# Patient Record
Sex: FEMALE | Race: WHITE | HISPANIC OR LATINO | Employment: UNEMPLOYED | RURAL
[De-identification: names, ages, dates, MRNs, and addresses within clinical notes are randomized per-mention and may not be internally consistent; named-entity substitution may affect disease eponyms.]

---

## 2022-12-27 LAB
ABO + RH BLD: NORMAL
ANTIBODY SCREEN: NEGATIVE
GLUCOSE SERPL-MCNC: 92 MG/DL
HBV SURFACE AG SERPL QL IA: NEGATIVE
HCT VFR BLD AUTO: 35 % (ref 36–46)
HGB BLD-MCNC: 11.9 G/DL (ref 12–16)
HIV 1+2 AB+HIV1 P24 AG SERPL QL IA: NORMAL
MCV RBC AUTO: 86 FL (ref 82–108)
PLATELET # BLD AUTO: 146 K/UL (ref 150–399)
RPR: NORMAL
RUBELLA IMMUNE STATUS: NORMAL
TSH SERPL DL<=0.005 MIU/L-ACNC: 0.77 UIU/ML (ref 0.41–5.9)

## 2023-04-17 LAB
GLUCOSE SERPL-MCNC: 100 MG/DL
HCT VFR BLD AUTO: 30.5 % (ref 36–46)
HGB BLD-MCNC: 10.2 G/DL (ref 12–16)
MCV RBC AUTO: 86 FL (ref 82–108)
PLATELET # BLD AUTO: 139 K/UL (ref 150–399)

## 2023-05-08 ENCOUNTER — OFFICE VISIT (OUTPATIENT)
Dept: OBSTETRICS AND GYNECOLOGY | Facility: CLINIC | Age: 33
End: 2023-05-08
Payer: COMMERCIAL

## 2023-05-08 VITALS
DIASTOLIC BLOOD PRESSURE: 67 MMHG | SYSTOLIC BLOOD PRESSURE: 96 MMHG | TEMPERATURE: 98 F | HEART RATE: 76 BPM | OXYGEN SATURATION: 98 % | BODY MASS INDEX: 43.66 KG/M2 | RESPIRATION RATE: 18 BRPM | HEIGHT: 63 IN | WEIGHT: 246.38 LBS

## 2023-05-08 DIAGNOSIS — Z86.2 H/O THROMBOCYTOPENIA: ICD-10-CM

## 2023-05-08 DIAGNOSIS — Z86.2 HISTORY OF ANEMIA: ICD-10-CM

## 2023-05-08 DIAGNOSIS — Z3A.31 31 WEEKS GESTATION OF PREGNANCY: Primary | ICD-10-CM

## 2023-05-08 LAB
BILIRUB SERPL-MCNC: NORMAL MG/DL
BLOOD, POC UA: NORMAL
GLUCOSE UR QL STRIP: NORMAL
KETONES UR QL STRIP: NORMAL
LEUKOCYTE ESTERASE URINE, POC: NORMAL
NITRITE, POC UA: NORMAL
PH, POC UA: 6.5
PROTEIN, POC: NORMAL
SPECIFIC GRAVITY, POC UA: 1.02
UROBILINOGEN, POC UA: 0.2

## 2023-05-08 PROCEDURE — 1159F MED LIST DOCD IN RCRD: CPT | Mod: ,,, | Performed by: ADVANCED PRACTICE MIDWIFE

## 2023-05-08 PROCEDURE — 3078F PR MOST RECENT DIASTOLIC BLOOD PRESSURE < 80 MM HG: ICD-10-PCS | Mod: ,,, | Performed by: ADVANCED PRACTICE MIDWIFE

## 2023-05-08 PROCEDURE — 3078F DIAST BP <80 MM HG: CPT | Mod: ,,, | Performed by: ADVANCED PRACTICE MIDWIFE

## 2023-05-08 PROCEDURE — 0501F PRENATAL FLOW SHEET: CPT | Mod: ,,, | Performed by: ADVANCED PRACTICE MIDWIFE

## 2023-05-08 PROCEDURE — 3074F PR MOST RECENT SYSTOLIC BLOOD PRESSURE < 130 MM HG: ICD-10-PCS | Mod: ,,, | Performed by: ADVANCED PRACTICE MIDWIFE

## 2023-05-08 PROCEDURE — 3008F BODY MASS INDEX DOCD: CPT | Mod: ,,, | Performed by: ADVANCED PRACTICE MIDWIFE

## 2023-05-08 PROCEDURE — 0501F PR INTITIAL PRENATAL CARE VISIT W/FLOW SHEET: ICD-10-PCS | Mod: ,,, | Performed by: ADVANCED PRACTICE MIDWIFE

## 2023-05-08 PROCEDURE — 3008F PR BODY MASS INDEX (BMI) DOCUMENTED: ICD-10-PCS | Mod: ,,, | Performed by: ADVANCED PRACTICE MIDWIFE

## 2023-05-08 PROCEDURE — 1159F PR MEDICATION LIST DOCUMENTED IN MEDICAL RECORD: ICD-10-PCS | Mod: ,,, | Performed by: ADVANCED PRACTICE MIDWIFE

## 2023-05-08 PROCEDURE — 3074F SYST BP LT 130 MM HG: CPT | Mod: ,,, | Performed by: ADVANCED PRACTICE MIDWIFE

## 2023-05-08 NOTE — PROGRESS NOTES
"32 y.o. female  at 31w4d   She c/o no problems. She is transferring care from clinic in [place]- a release of information has been signed and sent to obtain records. She indicated she has had a 1 hr glucola screen approximately 2 weeks ago and an ultrasound at the previous clinic 2 weeks ago. She denies any issues or problems during the current pregnancy or previous pregnancy.   Reports good fetal movement or fluttering. Denies any vaginal bleeding, leakage of fluid, cramping, contractions, or pressure.   Total weight gain/weight loss in pregnancy: 10.4 kg (23 lb 0.4 oz)     Records reviewed- had a total of 8 visits and 5 ultrasounds  EDC 2023 per 8 week and 5 day US performed on 2022 at [facility]     Vitals  BP: 96/67  Temp: 97.8 °F (36.6 °C)  Temp Source: Oral  Pulse: 76  Resp: 18  SpO2: 98 %  Height: 5' 3" (160 cm)  Weight: 111.8 kg (246 lb 6.4 oz)  Prenatal  Fundal Height (cm): 34 cm  Fetal Heart Rate: 139  Movement: Present  Urine Albumin/Glucose  Urine Albumin: Negative  Urine Glucose: Negative  Edema  LLE Edema: None  RLE Edema: None  Facial: None  Additional Edema?: No    Prenatal Labs: pending from previous clinic      A: 31w4d           ICD-10-CM ICD-9-CM    1. 31 weeks gestation of pregnancy  Z3A.31 V22.2 POCT URINALYSIS      2. History of anemia  Z86.2 V12.3       3. BMI 40.0-44.9, adult  Z68.41 V85.41           P: Bleeding, daily fetal kick counts, and  labor/labor precautions discussed.    The following were addressed during this visit:    29-32 Weeks  - Tdap Given   - Contraception/Tubal Consent   - Pre-registration   - Circumsision plans   - Op note review/ consent   - Birth Plan   - Pediatrician   - Fetal Kick Counts/PIH/PTL precautions   - Preeclampsia Education   - Quiet time   Release of information sent to obtain records  F/U in 1 week  Questions answered to desired level of satisfaction  Verbalized understanding to all information and instructions " provided.  Follow up in about 1 week (around 5/15/2023), or if symptoms worsen or fail to improve, for SADIA Augustin, BISI, CNM, WHNP-BC

## 2023-05-16 ENCOUNTER — ROUTINE PRENATAL (OUTPATIENT)
Dept: OBSTETRICS AND GYNECOLOGY | Facility: CLINIC | Age: 33
End: 2023-05-16
Payer: COMMERCIAL

## 2023-05-16 VITALS
BODY MASS INDEX: 43.68 KG/M2 | DIASTOLIC BLOOD PRESSURE: 66 MMHG | HEART RATE: 83 BPM | SYSTOLIC BLOOD PRESSURE: 98 MMHG | WEIGHT: 246.63 LBS

## 2023-05-16 DIAGNOSIS — Z36.89 ENCOUNTER FOR FETAL ANATOMIC SURVEY: ICD-10-CM

## 2023-05-16 DIAGNOSIS — Z3A.32 32 WEEKS GESTATION OF PREGNANCY: Primary | ICD-10-CM

## 2023-05-16 PROCEDURE — 0502F PR SUBSEQUENT PRENATAL CARE: ICD-10-PCS | Mod: ,,, | Performed by: ADVANCED PRACTICE MIDWIFE

## 2023-05-16 PROCEDURE — 0502F SUBSEQUENT PRENATAL CARE: CPT | Mod: ,,, | Performed by: ADVANCED PRACTICE MIDWIFE

## 2023-05-16 NOTE — PROGRESS NOTES
T:  97.9    02%:  98    32 y.o. female  at 32w5d   She c/o no problems. States was told earlier in the pregnancy that her platelets were a little low and they would be monitored. I informed her of the risks of frequent US in pregnancy and would obtain an ultrasound for a fetal anatomy scan, prn for BPP if indicated later in pregnancy and prn, no US would be obtained every visit.   Reports good fetal movement or fluttering. Denies any vaginal bleeding, leakage of fluid, cramping, contractions, or pressure.   Total weight gain/weight loss in pregnancy: 10.5 kg (23 lb 3.6 oz)     Vitals  BP: 98/66  Pulse: 83  Weight: 111.9 kg (246 lb 9.6 oz)  Prenatal  Fundal Height (cm): 34 cm  Fetal Heart Rate: 146  Movement: Present  Urine Albumin/Glucose  Urine Albumin: Negative  Urine Glucose: Negative  Edema  LLE Edema: None  RLE Edema: None  Facial: None  Additional Edema?: No    Prenatal Labs:  Lab Results   Component Value Date    GROUPTRH B POS 2022    HGB 10.2 (A) 2023    HCT 30.5 (A) 2023     (A) 2023    RUBELLAIMMUN Immune 2022    HEPBSAG Negative 2022    [id number] None-Reactive 2022    RPR Non-Reactive 2022    OBGLUCOSESCR 100 2023       A: 32w5d           ICD-10-CM ICD-9-CM    1. 32 weeks gestation of pregnancy  Z3A.32 V22.2 POCT URINALYSIS      US OB 14+ Wks, TransAbd, Single Gestation      2. BMI 40.0-44.9, adult  Z68.41 V85.41 US OB 14+ Wks, TransAbd, Single Gestation      3. Encounter for fetal anatomic survey  Z36.89 V28.81 US OB 14+ Wks, TransAbd, Single Gestation          P: Bleeding, daily fetal kick counts, and  labor/labor precautions discussed.    The following were addressed during this visit:    33-36 Weeks  - Childbirth Education/Hospital Tours   - Breastfeeding   - Group B Strep Test/HIV/RPR   - Fetal Kick Counts/PIH/PTL precautions       Questions answered to desired level of satisfaction  Verbalized understanding to all  information and instructions provided.  Follow up in about 2 weeks (around 5/30/2023), or if symptoms worsen or fail to improve, for JOSE with CBC.    Wilda Augustin DNP, CNM, WHNP-BC

## 2023-06-01 ENCOUNTER — ROUTINE PRENATAL (OUTPATIENT)
Dept: OBSTETRICS AND GYNECOLOGY | Facility: CLINIC | Age: 33
End: 2023-06-01
Payer: COMMERCIAL

## 2023-06-01 ENCOUNTER — HOSPITAL ENCOUNTER (OUTPATIENT)
Dept: RADIOLOGY | Facility: HOSPITAL | Age: 33
Discharge: HOME | End: 2023-06-01
Attending: ADVANCED PRACTICE MIDWIFE
Payer: COMMERCIAL

## 2023-06-01 VITALS — BODY MASS INDEX: 44.11 KG/M2 | WEIGHT: 249 LBS

## 2023-06-01 DIAGNOSIS — Z11.3 SCREEN FOR SEXUALLY TRANSMITTED DISEASES: ICD-10-CM

## 2023-06-01 DIAGNOSIS — Z36.89 ENCOUNTER FOR FETAL ANATOMIC SURVEY: ICD-10-CM

## 2023-06-01 DIAGNOSIS — Z72.51 RISK FOR SEXUALLY TRANSMITTED DISEASE: ICD-10-CM

## 2023-06-01 DIAGNOSIS — Z3A.32 32 WEEKS GESTATION OF PREGNANCY: ICD-10-CM

## 2023-06-01 DIAGNOSIS — Z3A.35 35 WEEKS GESTATION OF PREGNANCY: Primary | ICD-10-CM

## 2023-06-01 LAB
BASOPHILS # BLD AUTO: 0.01 K/UL (ref 0–0.2)
BASOPHILS NFR BLD AUTO: 0.1 % (ref 0–1)
BILIRUB SERPL-MCNC: NORMAL MG/DL
BLOOD, POC UA: NORMAL
CANDIDA SPECIES: POSITIVE
CTP QC/QA: YES
DIFFERENTIAL METHOD BLD: ABNORMAL
EOSINOPHIL # BLD AUTO: 0.01 K/UL (ref 0–0.5)
EOSINOPHIL NFR BLD AUTO: 0.1 % (ref 1–4)
ERYTHROCYTE [DISTWIDTH] IN BLOOD BY AUTOMATED COUNT: 14 % (ref 11.5–14.5)
GARDNERELLA: NEGATIVE
GLUCOSE UR QL STRIP: NORMAL
HCT VFR BLD AUTO: 33.9 % (ref 38–47)
HGB BLD-MCNC: 10.7 G/DL (ref 12–16)
IMM GRANULOCYTES # BLD AUTO: 0.02 K/UL (ref 0–0.04)
IMM GRANULOCYTES NFR BLD: 0.3 % (ref 0–0.4)
KETONES UR QL STRIP: 15
LEUKOCYTE ESTERASE URINE, POC: NORMAL
LYMPHOCYTES # BLD AUTO: 2.34 K/UL (ref 1–4.8)
LYMPHOCYTES NFR BLD AUTO: 29.4 % (ref 27–41)
MCH RBC QN AUTO: 27.8 PG (ref 27–31)
MCHC RBC AUTO-ENTMCNC: 31.6 G/DL (ref 32–36)
MCV RBC AUTO: 88.1 FL (ref 80–96)
MONOCYTES # BLD AUTO: 0.35 K/UL (ref 0–0.8)
MONOCYTES NFR BLD AUTO: 4.4 % (ref 2–6)
MPC BLD CALC-MCNC: 12.9 FL (ref 9.4–12.4)
NEUTROPHILS # BLD AUTO: 5.22 K/UL (ref 1.8–7.7)
NEUTROPHILS NFR BLD AUTO: 65.7 % (ref 53–65)
NITRITE, POC UA: NORMAL
NRBC # BLD AUTO: 0 X10E3/UL
NRBC, AUTO (.00): 0 %
PH, POC UA: 7
PLATELET # BLD AUTO: 127 K/UL (ref 150–400)
POC (AMP) AMPHETAMINE: NEGATIVE
POC (BAR) BARBITURATES: NEGATIVE
POC (BUP) BUPRENORPHINE: NEGATIVE
POC (BZO) BENZODIAZEPINES: NEGATIVE
POC (COC) COCAINE: NEGATIVE
POC (MDMA) METHYLENEDIOXYMETHAMPHETAMINE 3,4: NEGATIVE
POC (MET) METHAMPHETAMINE: NEGATIVE
POC (MOP) OPIATES: NEGATIVE
POC (MTD) METHADONE: NEGATIVE
POC (OXY) OXYCODONE: NEGATIVE
POC (PCP) PHENCYCLIDINE: NEGATIVE
POC (TCA) TRICYCLIC ANTIDEPRESSANTS: NEGATIVE
POC TEMPERATURE (URINE): 94
POC THC: NEGATIVE
PROTEIN, POC: NORMAL
RBC # BLD AUTO: 3.85 M/UL (ref 4.2–5.4)
SPECIFIC GRAVITY, POC UA: 1.01
SYPHILIS AB INTERPRETATION: NORMAL
TRICHOMONAS: NEGATIVE
UROBILINOGEN, POC UA: 0.2
WBC # BLD AUTO: 7.95 K/UL (ref 4.5–11)

## 2023-06-01 PROCEDURE — 87660 BACTERIAL VAGINOSIS: ICD-10-PCS | Mod: ,,, | Performed by: CLINICAL MEDICAL LABORATORY

## 2023-06-01 PROCEDURE — 87491 CHLAMYDIA/GONORRHOEAE(GC), PCR: ICD-10-PCS | Mod: ,,, | Performed by: CLINICAL MEDICAL LABORATORY

## 2023-06-01 PROCEDURE — 76820 PR US, OB DOPPLER, FETAL UMBILICAL ARTERY ECHO: ICD-10-PCS | Mod: 26,,, | Performed by: RADIOLOGY

## 2023-06-01 PROCEDURE — 76805 OB US >/= 14 WKS SNGL FETUS: CPT | Mod: 26,,, | Performed by: RADIOLOGY

## 2023-06-01 PROCEDURE — 85025 CBC WITH DIFFERENTIAL: ICD-10-PCS | Mod: ,,, | Performed by: CLINICAL MEDICAL LABORATORY

## 2023-06-01 PROCEDURE — 87653 STREP B SCREEN, ANTEPARTUM: ICD-10-PCS | Mod: ,,, | Performed by: CLINICAL MEDICAL LABORATORY

## 2023-06-01 PROCEDURE — 86780 TREPONEMA PALLIDUM: CPT | Mod: ,,, | Performed by: CLINICAL MEDICAL LABORATORY

## 2023-06-01 PROCEDURE — 76805 US OB 14+ WEEKS, TRANSABDOM, SINGLE GESTATION: ICD-10-PCS | Mod: 26,,, | Performed by: RADIOLOGY

## 2023-06-01 PROCEDURE — 87653 STREP B DNA AMP PROBE: CPT | Mod: ,,, | Performed by: CLINICAL MEDICAL LABORATORY

## 2023-06-01 PROCEDURE — 87480 CANDIDA DNA DIR PROBE: CPT | Mod: ,,, | Performed by: CLINICAL MEDICAL LABORATORY

## 2023-06-01 PROCEDURE — 87591 CHLAMYDIA/GONORRHOEAE(GC), PCR: ICD-10-PCS | Mod: ,,, | Performed by: CLINICAL MEDICAL LABORATORY

## 2023-06-01 PROCEDURE — 87480 BACTERIAL VAGINOSIS: ICD-10-PCS | Mod: ,,, | Performed by: CLINICAL MEDICAL LABORATORY

## 2023-06-01 PROCEDURE — 87660 TRICHOMONAS VAGIN DIR PROBE: CPT | Mod: ,,, | Performed by: CLINICAL MEDICAL LABORATORY

## 2023-06-01 PROCEDURE — 85025 COMPLETE CBC W/AUTO DIFF WBC: CPT | Mod: ,,, | Performed by: CLINICAL MEDICAL LABORATORY

## 2023-06-01 PROCEDURE — 0502F SUBSEQUENT PRENATAL CARE: CPT | Mod: ,,, | Performed by: ADVANCED PRACTICE MIDWIFE

## 2023-06-01 PROCEDURE — 87591 N.GONORRHOEAE DNA AMP PROB: CPT | Mod: ,,, | Performed by: CLINICAL MEDICAL LABORATORY

## 2023-06-01 PROCEDURE — 80305 POCT URINE DRUG SCREEN PRESUMP: ICD-10-PCS | Mod: QW,,, | Performed by: ADVANCED PRACTICE MIDWIFE

## 2023-06-01 PROCEDURE — 87491 CHLMYD TRACH DNA AMP PROBE: CPT | Mod: ,,, | Performed by: CLINICAL MEDICAL LABORATORY

## 2023-06-01 PROCEDURE — 76805 OB US >/= 14 WKS SNGL FETUS: CPT | Mod: TC

## 2023-06-01 PROCEDURE — 87510 GARDNER VAG DNA DIR PROBE: CPT | Mod: ,,, | Performed by: CLINICAL MEDICAL LABORATORY

## 2023-06-01 PROCEDURE — 36415 COLL VENOUS BLD VENIPUNCTURE: CPT | Mod: ,,, | Performed by: ADVANCED PRACTICE MIDWIFE

## 2023-06-01 PROCEDURE — 80305 DRUG TEST PRSMV DIR OPT OBS: CPT | Mod: QW,,, | Performed by: ADVANCED PRACTICE MIDWIFE

## 2023-06-01 PROCEDURE — 86780 TREPONEMA PALLIDUM (SYPHILIS) ANTIBODY: ICD-10-PCS | Mod: ,,, | Performed by: CLINICAL MEDICAL LABORATORY

## 2023-06-01 PROCEDURE — 0502F PR SUBSEQUENT PRENATAL CARE: ICD-10-PCS | Mod: ,,, | Performed by: ADVANCED PRACTICE MIDWIFE

## 2023-06-01 PROCEDURE — 76820 UMBILICAL ARTERY ECHO: CPT | Mod: 26,,, | Performed by: RADIOLOGY

## 2023-06-01 PROCEDURE — 36415 PR COLLECTION VENOUS BLOOD,VENIPUNCTURE: ICD-10-PCS | Mod: ,,, | Performed by: ADVANCED PRACTICE MIDWIFE

## 2023-06-01 PROCEDURE — 87510 BACTERIAL VAGINOSIS: ICD-10-PCS | Mod: ,,, | Performed by: CLINICAL MEDICAL LABORATORY

## 2023-06-01 NOTE — PROGRESS NOTES
32 y.o. female  at 35w0d   She c/o no problems  Reports good fetal movement or fluttering. Denies any vaginal bleeding, leakage of fluid, cramping, contractions, or pressure.   Total weight gain/weight loss in pregnancy: 11.6 kg (25 lb 10 oz)     Vitals  Weight: 112.9 kg (249 lb)    Prenatal Labs:  Lab Results   Component Value Date    GROUPTRH B POS 2022    HGB 10.2 (A) 2023    HCT 30.5 (A) 2023     (A) 2023    RUBELLAIMMUN Immune 2022    HEPBSAG Negative 2022    [id number] None-Reactive 2022    RPR Non-Reactive 2022    OBGLUCOSESCR 100 2023       A: 35w0d           ICD-10-CM ICD-9-CM    1. 35 weeks gestation of pregnancy  Z3A.35 V22.2 POCT URINALYSIS      POCT Urine Drug Screen Presump      CBC Auto Differential      CBC Auto Differential      2. Screen for sexually transmitted diseases  Z11.3 V74.5 Strep B Screen, Antepartum      Chlamydia/GC, PCR      Bacterial Vaginosis      Treponema Pallidum (Syphillis) Antibody      Treponema Pallidum (Syphillis) Antibody      3. Risk for sexually transmitted disease  Z72.51 V69.2 Strep B Screen, Antepartum      Chlamydia/GC, PCR      Bacterial Vaginosis      Treponema Pallidum (Syphillis) Antibody      Treponema Pallidum (Syphillis) Antibody          P: Bleeding, daily fetal kick counts, and  labor/labor precautions discussed.    No pregnancy checklist tasks were completed during this visit, and no tasks are pending completion.      Questions answered to desired level of satisfaction  Verbalized understanding to all information and instructions provided.  Follow up in about 1 week (around 2023), or if symptoms worsen or fail to improve, for SADIA Augustin, DNP, CNM, WHNP-BC

## 2023-06-02 LAB
CHLAMYDIA BY PCR: NEGATIVE
GROUP B STREP, PCR: NEGATIVE
N. GONORRHOEAE (GC) BY PCR: NEGATIVE

## 2023-06-05 ENCOUNTER — PATIENT MESSAGE (OUTPATIENT)
Dept: OBSTETRICS AND GYNECOLOGY | Facility: CLINIC | Age: 33
End: 2023-06-05
Payer: COMMERCIAL

## 2023-06-05 DIAGNOSIS — B37.31 VAGINAL YEAST INFECTION: ICD-10-CM

## 2023-06-05 DIAGNOSIS — D64.9 ANEMIA, UNSPECIFIED TYPE: Primary | ICD-10-CM

## 2023-06-05 RX ORDER — FERROUS SULFATE 325(65) MG
325 TABLET, DELAYED RELEASE (ENTERIC COATED) ORAL
Qty: 60 TABLET | Refills: 4 | Status: SHIPPED | OUTPATIENT
Start: 2023-06-05 | End: 2023-06-28 | Stop reason: SDUPTHER

## 2023-06-05 RX ORDER — TERCONAZOLE 4 MG/G
1 CREAM VAGINAL NIGHTLY
Qty: 45 G | Refills: 0 | Status: SHIPPED | OUTPATIENT
Start: 2023-06-05 | End: 2023-06-10

## 2023-06-08 ENCOUNTER — ROUTINE PRENATAL (OUTPATIENT)
Dept: OBSTETRICS AND GYNECOLOGY | Facility: CLINIC | Age: 33
End: 2023-06-08
Payer: COMMERCIAL

## 2023-06-08 VITALS
SYSTOLIC BLOOD PRESSURE: 96 MMHG | DIASTOLIC BLOOD PRESSURE: 67 MMHG | WEIGHT: 250.81 LBS | HEART RATE: 75 BPM | BODY MASS INDEX: 44.43 KG/M2

## 2023-06-08 DIAGNOSIS — Z3A.36 36 WEEKS GESTATION OF PREGNANCY: Primary | ICD-10-CM

## 2023-06-08 DIAGNOSIS — Z86.2 H/O THROMBOCYTOPENIA: ICD-10-CM

## 2023-06-08 LAB
BILIRUB SERPL-MCNC: NORMAL MG/DL
BLOOD, POC UA: NORMAL
GLUCOSE UR QL STRIP: NORMAL
KETONES UR QL STRIP: NORMAL
LEUKOCYTE ESTERASE URINE, POC: NORMAL
NITRITE, POC UA: NORMAL
PH, POC UA: 6.5
PROTEIN, POC: NORMAL
SPECIFIC GRAVITY, POC UA: 1.01
UROBILINOGEN, POC UA: 0.2

## 2023-06-08 PROCEDURE — 0502F PR SUBSEQUENT PRENATAL CARE: ICD-10-PCS | Mod: ,,, | Performed by: ADVANCED PRACTICE MIDWIFE

## 2023-06-08 PROCEDURE — 0502F SUBSEQUENT PRENATAL CARE: CPT | Mod: ,,, | Performed by: ADVANCED PRACTICE MIDWIFE

## 2023-06-08 NOTE — PROGRESS NOTES
T:  98.0      02%:  98  32 y.o. female  at 36w0d   She c/o some occasional pressure  Reports good fetal movement or fluttering. Denies any vaginal bleeding, leakage of fluid, cramping, contractions, or pressure.   Total weight gain/weight loss in pregnancy: 12.4 kg (27 lb 6.8 oz)     Vitals  BP: 96/67  Pulse: 75  Weight: 113.8 kg (250 lb 12.8 oz)  Prenatal  Fundal Height (cm): 36 cm  Fetal Heart Rate: 130    Prenatal Labs:  Lab Results   Component Value Date    GROUPTRH B POS 2022    HGB 10.7 (L) 2023    HCT 33.9 (L) 2023     (L) 2023    RUBELLAIMMUN Immune 2022    HEPBSAG Negative 2022    [id number] None-Reactive 2022    RPR Non-Reactive 2022    LABNGO Negative 2023    OBGLUCOSESCR 100 2023       A: 36w0d           ICD-10-CM ICD-9-CM    1. 36 weeks gestation of pregnancy  Z3A.36 V22.2 POCT URINALYSIS      2. H/O thrombocytopenia  Z86.2 V12.3       3. BMI 40.0-44.9, adult  Z68.41 V85.41           P: Bleeding, daily fetal kick counts, and  labor/labor precautions discussed.  US next week secondary to BMI 44  No pregnancy checklist tasks were completed during this visit, and no tasks are pending completion.      Questions answered to desired level of satisfaction  Verbalized understanding to all information and instructions provided.  Follow up in about 1 week (around 6/15/2023), or if symptoms worsen or fail to improve, for JOSE, Ultrasound.    Wilda Augustin, BISI, CNM, WHNP-BC

## 2023-06-13 ENCOUNTER — ROUTINE PRENATAL (OUTPATIENT)
Dept: OBSTETRICS AND GYNECOLOGY | Facility: CLINIC | Age: 33
End: 2023-06-13
Payer: COMMERCIAL

## 2023-06-13 ENCOUNTER — PROCEDURE VISIT (OUTPATIENT)
Dept: OBSTETRICS AND GYNECOLOGY | Facility: CLINIC | Age: 33
End: 2023-06-13
Payer: COMMERCIAL

## 2023-06-13 VITALS
DIASTOLIC BLOOD PRESSURE: 71 MMHG | HEART RATE: 80 BPM | BODY MASS INDEX: 44.5 KG/M2 | SYSTOLIC BLOOD PRESSURE: 106 MMHG | WEIGHT: 251.19 LBS

## 2023-06-13 DIAGNOSIS — Z3A.36 36 WEEKS GESTATION OF PREGNANCY: Primary | ICD-10-CM

## 2023-06-13 DIAGNOSIS — Z86.2 H/O THROMBOCYTOPENIA: ICD-10-CM

## 2023-06-13 LAB
BILIRUB SERPL-MCNC: NORMAL MG/DL
BLOOD, POC UA: NORMAL
GLUCOSE UR QL STRIP: NORMAL
KETONES UR QL STRIP: NORMAL
LEUKOCYTE ESTERASE URINE, POC: NORMAL
NITRITE, POC UA: NORMAL
PH, POC UA: 6
PROTEIN, POC: NORMAL
SPECIFIC GRAVITY, POC UA: >=1.03
UROBILINOGEN, POC UA: 0.2

## 2023-06-13 PROCEDURE — 0502F PR SUBSEQUENT PRENATAL CARE: ICD-10-PCS | Mod: ,,, | Performed by: ADVANCED PRACTICE MIDWIFE

## 2023-06-13 PROCEDURE — 0502F SUBSEQUENT PRENATAL CARE: CPT | Mod: ,,, | Performed by: ADVANCED PRACTICE MIDWIFE

## 2023-06-13 PROCEDURE — 76805 PR US, OB 14+WKS, TRANSABD, SINGLE GESTATION: ICD-10-PCS | Mod: ,,, | Performed by: OBSTETRICS & GYNECOLOGY

## 2023-06-13 PROCEDURE — 76805 OB US >/= 14 WKS SNGL FETUS: CPT | Mod: ,,, | Performed by: OBSTETRICS & GYNECOLOGY

## 2023-06-13 PROCEDURE — 76819 PR US, OB, FETAL BIOPHYSICAL, W/O NST: ICD-10-PCS | Mod: ,,, | Performed by: OBSTETRICS & GYNECOLOGY

## 2023-06-13 PROCEDURE — 99499 UNLISTED E&M SERVICE: CPT | Mod: ,,, | Performed by: OBSTETRICS & GYNECOLOGY

## 2023-06-13 PROCEDURE — 99499 NO LOS: ICD-10-PCS | Mod: ,,, | Performed by: OBSTETRICS & GYNECOLOGY

## 2023-06-13 PROCEDURE — 76819 FETAL BIOPHYS PROFIL W/O NST: CPT | Mod: ,,, | Performed by: OBSTETRICS & GYNECOLOGY

## 2023-06-13 NOTE — PROGRESS NOTES
T:  97.6      02%:  99    32 y.o. female  at 36w5d   She c/o some pressure  Reports good fetal movement or fluttering. Denies any vaginal bleeding, leakage of fluid, cramping, contractions, or pressure.   Total weight gain/weight loss in pregnancy: 12.6 kg (27 lb 13.2 oz)     Vitals  BP: 106/71  Pulse: 80  Weight: 113.9 kg (251 lb 3.2 oz)    Prenatal Labs:  Lab Results   Component Value Date    GROUPTRH B POS 2022    HGB 10.7 (L) 2023    HCT 33.9 (L) 2023     (L) 2023    RUBELLAIMMUN Immune 2022    HEPBSAG Negative 2022    [id number] None-Reactive 2022    RPR Non-Reactive 2022    LABNGO Negative 2023    OBGLUCOSESCR 100 2023       A: 36w5d           ICD-10-CM ICD-9-CM    1. 36 weeks gestation of pregnancy  Z3A.36 V22.2 POCT URINALYSIS          P: Bleeding, daily fetal kick counts, and  labor/labor precautions discussed.    The following were addressed during this visit:    37-41 Weeks  - Postpartum Immunizations   - Hospital Stay Expectations   - When to go to the hospital   - Fetal kick counts/PIH/Bleeding/ROM/Labor precautions   - Labor induction policy   - Cervical ripening   - Breastfeeding review: benefits of breastfeeding, early skin to skin, 24/7 rooming in, exclusive breastfeeding for 6 months       Questions answered to desired level of satisfaction  Verbalized understanding to all information and instructions provided.  Follow up in about 1 week (around 2023), or if symptoms worsen or fail to improve, for SADIA Augustin, DNP, CNM, WHNP-BC

## 2023-06-20 ENCOUNTER — ROUTINE PRENATAL (OUTPATIENT)
Dept: OBSTETRICS AND GYNECOLOGY | Facility: CLINIC | Age: 33
End: 2023-06-20
Payer: COMMERCIAL

## 2023-06-20 ENCOUNTER — PROCEDURE VISIT (OUTPATIENT)
Dept: OBSTETRICS AND GYNECOLOGY | Facility: CLINIC | Age: 33
End: 2023-06-20
Payer: COMMERCIAL

## 2023-06-20 VITALS — HEART RATE: 92 BPM | WEIGHT: 250.38 LBS | BODY MASS INDEX: 44.36 KG/M2

## 2023-06-20 DIAGNOSIS — Z3A.37 37 WEEKS GESTATION OF PREGNANCY: Primary | ICD-10-CM

## 2023-06-20 DIAGNOSIS — O36.8130 DECREASED FETAL MOVEMENTS IN THIRD TRIMESTER, SINGLE OR UNSPECIFIED FETUS: ICD-10-CM

## 2023-06-20 LAB
BILIRUB SERPL-MCNC: NORMAL MG/DL
BLOOD, POC UA: NORMAL
GLUCOSE UR QL STRIP: NORMAL
KETONES UR QL STRIP: NORMAL
LEUKOCYTE ESTERASE URINE, POC: NORMAL
NITRITE, POC UA: NORMAL
PH, POC UA: 6
PROTEIN, POC: NORMAL
SPECIFIC GRAVITY, POC UA: 1.02
UROBILINOGEN, POC UA: 0.2

## 2023-06-20 PROCEDURE — 99499 NO LOS: ICD-10-PCS | Mod: ,,, | Performed by: OBSTETRICS & GYNECOLOGY

## 2023-06-20 PROCEDURE — 76819 PR US, OB, FETAL BIOPHYSICAL, W/O NST: ICD-10-PCS | Mod: ,,, | Performed by: OBSTETRICS & GYNECOLOGY

## 2023-06-20 PROCEDURE — 99499 UNLISTED E&M SERVICE: CPT | Mod: ,,, | Performed by: OBSTETRICS & GYNECOLOGY

## 2023-06-20 PROCEDURE — 0502F SUBSEQUENT PRENATAL CARE: CPT | Mod: ,,, | Performed by: ADVANCED PRACTICE MIDWIFE

## 2023-06-20 PROCEDURE — 76805 OB US >/= 14 WKS SNGL FETUS: CPT | Mod: ,,, | Performed by: OBSTETRICS & GYNECOLOGY

## 2023-06-20 PROCEDURE — 76819 FETAL BIOPHYS PROFIL W/O NST: CPT | Mod: ,,, | Performed by: OBSTETRICS & GYNECOLOGY

## 2023-06-20 PROCEDURE — 76805 PR US, OB 14+WKS, TRANSABD, SINGLE GESTATION: ICD-10-PCS | Mod: ,,, | Performed by: OBSTETRICS & GYNECOLOGY

## 2023-06-20 PROCEDURE — 0502F PR SUBSEQUENT PRENATAL CARE: ICD-10-PCS | Mod: ,,, | Performed by: ADVANCED PRACTICE MIDWIFE

## 2023-06-20 NOTE — PROGRESS NOTES
T:  98.1      02%:  99    32 y.o. female  at 37w5d   She c/o occasional pressure.   Reports good fetal movement or fluttering. Denies any vaginal bleeding, leakage of fluid, cramping, contractions, or pressure.   Total weight gain/weight loss in pregnancy: 12.3 kg (27 lb 0.4 oz)     Vitals  Pulse: 92  Weight: 113.6 kg (250 lb 6.4 oz)  Prenatal  Fundal Height (cm): 37 cm  Fetal Heart Rate: 156  Movement: Present  Urine Albumin/Glucose  Urine Albumin: Negative  Urine Glucose: Negative  Edema  LLE Edema: None  RLE Edema: None  Facial: None  Additional Edema?: No  Cervical Exam  Dilation: 3  Effacement (%): 70  Station: -3  Presentation: Vertex  Station (Labor Curve): 8 cm  Dilation/Effacement/Station  Dilation: 3  Effacement (%): 70  Station: -3    Prenatal Labs:  Lab Results   Component Value Date    GROUPTRH B POS 2022    HGB 10.7 (L) 2023    HCT 33.9 (L) 2023     (L) 2023    RUBELLAIMMUN Immune 2022    HEPBSAG Negative 2022    [id number] None-Reactive 2022    RPR Non-Reactive 2022    LABNGO Negative 2023    OBGLUCOSESCR 100 2023       A: 37w5d           ICD-10-CM ICD-9-CM    1. 37 weeks gestation of pregnancy  Z3A.37 V22.2 POCT URINALYSIS          P: Bleeding, daily fetal kick counts, and  labor/labor precautions discussed.  No pregnancy checklist tasks were completed during this visit, and no tasks are pending completion.  Questions answered to desired level of satisfaction  Verbalized understanding to all information and instructions provided.  Follow up in about 1 week (around 2023), or if symptoms worsen or fail to improve, for JOSE, Ultrasound.    Wilda Augustin, BISI, CNM, WHNP-BC

## 2023-06-27 ENCOUNTER — ROUTINE PRENATAL (OUTPATIENT)
Dept: OBSTETRICS AND GYNECOLOGY | Facility: CLINIC | Age: 33
End: 2023-06-27
Payer: COMMERCIAL

## 2023-06-27 ENCOUNTER — PROCEDURE VISIT (OUTPATIENT)
Dept: OBSTETRICS AND GYNECOLOGY | Facility: CLINIC | Age: 33
End: 2023-06-27
Payer: COMMERCIAL

## 2023-06-27 VITALS
BODY MASS INDEX: 45.1 KG/M2 | WEIGHT: 254.63 LBS | HEART RATE: 92 BPM | SYSTOLIC BLOOD PRESSURE: 105 MMHG | DIASTOLIC BLOOD PRESSURE: 71 MMHG

## 2023-06-27 DIAGNOSIS — Z86.2 HISTORY OF THROMBOCYTOPENIA: ICD-10-CM

## 2023-06-27 DIAGNOSIS — Z3A.38 38 WEEKS GESTATION OF PREGNANCY: Primary | ICD-10-CM

## 2023-06-27 DIAGNOSIS — O36.8130 DECREASED FETAL MOVEMENTS IN THIRD TRIMESTER, SINGLE OR UNSPECIFIED FETUS: ICD-10-CM

## 2023-06-27 DIAGNOSIS — Z34.90 ENCOUNTER FOR ELECTIVE INDUCTION OF LABOR: ICD-10-CM

## 2023-06-27 PROBLEM — O09.93 HIGH-RISK PREGNANCY IN THIRD TRIMESTER: Status: ACTIVE | Noted: 2023-06-27

## 2023-06-27 LAB
BASOPHILS # BLD AUTO: 0.01 K/UL (ref 0–0.2)
BASOPHILS NFR BLD AUTO: 0.1 % (ref 0–1)
BILIRUB SERPL-MCNC: NORMAL MG/DL
BLOOD, POC UA: NORMAL
DIFFERENTIAL METHOD BLD: ABNORMAL
EOSINOPHIL # BLD AUTO: 0.03 K/UL (ref 0–0.5)
EOSINOPHIL NFR BLD AUTO: 0.3 % (ref 1–4)
ERYTHROCYTE [DISTWIDTH] IN BLOOD BY AUTOMATED COUNT: 14.9 % (ref 11.5–14.5)
GLUCOSE UR QL STRIP: NORMAL
HCT VFR BLD AUTO: 36.1 % (ref 38–47)
HGB BLD-MCNC: 11.3 G/DL (ref 12–16)
IMM GRANULOCYTES # BLD AUTO: 0.03 K/UL (ref 0–0.04)
IMM GRANULOCYTES NFR BLD: 0.3 % (ref 0–0.4)
KETONES UR QL STRIP: NORMAL
LEUKOCYTE ESTERASE URINE, POC: NORMAL
LYMPHOCYTES # BLD AUTO: 2.34 K/UL (ref 1–4.8)
LYMPHOCYTES NFR BLD AUTO: 25.7 % (ref 27–41)
MCH RBC QN AUTO: 27.6 PG (ref 27–31)
MCHC RBC AUTO-ENTMCNC: 31.3 G/DL (ref 32–36)
MCV RBC AUTO: 88 FL (ref 80–96)
MONOCYTES # BLD AUTO: 0.46 K/UL (ref 0–0.8)
MONOCYTES NFR BLD AUTO: 5 % (ref 2–6)
MPC BLD CALC-MCNC: 14 FL (ref 9.4–12.4)
NEUTROPHILS # BLD AUTO: 6.24 K/UL (ref 1.8–7.7)
NEUTROPHILS NFR BLD AUTO: 68.6 % (ref 53–65)
NITRITE, POC UA: NORMAL
NRBC # BLD AUTO: 0 X10E3/UL
NRBC, AUTO (.00): 0 %
PH, POC UA: 6.5
PLATELET # BLD AUTO: 127 K/UL (ref 150–400)
PLATELET MORPHOLOGY: ABNORMAL
PROTEIN, POC: 30
RBC # BLD AUTO: 4.1 M/UL (ref 4.2–5.4)
RBC MORPH BLD: NORMAL
SPECIFIC GRAVITY, POC UA: >=1.03
UROBILINOGEN, POC UA: 0.2
WBC # BLD AUTO: 9.11 K/UL (ref 4.5–11)

## 2023-06-27 PROCEDURE — 76819 PR US, OB, FETAL BIOPHYSICAL, W/O NST: ICD-10-PCS | Mod: 77,,, | Performed by: OBSTETRICS & GYNECOLOGY

## 2023-06-27 PROCEDURE — 76805 PR US, OB 14+WKS, TRANSABD, SINGLE GESTATION: ICD-10-PCS | Mod: ,,, | Performed by: OBSTETRICS & GYNECOLOGY

## 2023-06-27 PROCEDURE — 85025 CBC WITH DIFFERENTIAL: ICD-10-PCS | Mod: ,,, | Performed by: CLINICAL MEDICAL LABORATORY

## 2023-06-27 PROCEDURE — 36415 COLL VENOUS BLD VENIPUNCTURE: CPT | Mod: ,,, | Performed by: ADVANCED PRACTICE MIDWIFE

## 2023-06-27 PROCEDURE — 36415 PR COLLECTION VENOUS BLOOD,VENIPUNCTURE: ICD-10-PCS | Mod: ,,, | Performed by: ADVANCED PRACTICE MIDWIFE

## 2023-06-27 PROCEDURE — 99499 NO LOS: ICD-10-PCS | Mod: ,,, | Performed by: OBSTETRICS & GYNECOLOGY

## 2023-06-27 PROCEDURE — 0502F PR SUBSEQUENT PRENATAL CARE: ICD-10-PCS | Mod: ,,, | Performed by: ADVANCED PRACTICE MIDWIFE

## 2023-06-27 PROCEDURE — 76819 FETAL BIOPHYS PROFIL W/O NST: CPT | Mod: 77,,, | Performed by: OBSTETRICS & GYNECOLOGY

## 2023-06-27 PROCEDURE — 99499 UNLISTED E&M SERVICE: CPT | Mod: ,,, | Performed by: OBSTETRICS & GYNECOLOGY

## 2023-06-27 PROCEDURE — 76805 OB US >/= 14 WKS SNGL FETUS: CPT | Mod: ,,, | Performed by: OBSTETRICS & GYNECOLOGY

## 2023-06-27 PROCEDURE — 0502F SUBSEQUENT PRENATAL CARE: CPT | Mod: ,,, | Performed by: ADVANCED PRACTICE MIDWIFE

## 2023-06-27 PROCEDURE — 85025 COMPLETE CBC W/AUTO DIFF WBC: CPT | Mod: ,,, | Performed by: CLINICAL MEDICAL LABORATORY

## 2023-06-27 RX ORDER — SODIUM CHLORIDE, SODIUM LACTATE, POTASSIUM CHLORIDE, CALCIUM CHLORIDE 600; 310; 30; 20 MG/100ML; MG/100ML; MG/100ML; MG/100ML
INJECTION, SOLUTION INTRAVENOUS CONTINUOUS
Status: CANCELLED | OUTPATIENT
Start: 2023-06-27

## 2023-06-27 RX ORDER — LIDOCAINE HYDROCHLORIDE 10 MG/ML
10 INJECTION INFILTRATION; PERINEURAL ONCE AS NEEDED
Status: CANCELLED | OUTPATIENT
Start: 2023-06-27 | End: 2034-11-23

## 2023-06-27 RX ORDER — TERBUTALINE SULFATE 1 MG/ML
0.25 INJECTION SUBCUTANEOUS
Status: CANCELLED | OUTPATIENT
Start: 2023-06-27

## 2023-06-27 RX ORDER — OXYTOCIN/RINGER'S LACTATE 30/500 ML
0-30 PLASTIC BAG, INJECTION (ML) INTRAVENOUS CONTINUOUS
Status: CANCELLED | OUTPATIENT
Start: 2023-06-27

## 2023-06-27 RX ORDER — ONDANSETRON 2 MG/ML
4 INJECTION INTRAMUSCULAR; INTRAVENOUS EVERY 4 HOURS PRN
Status: CANCELLED | OUTPATIENT
Start: 2023-06-27

## 2023-06-27 RX ORDER — SODIUM CHLORIDE 9 MG/ML
INJECTION, SOLUTION INTRAVENOUS
Status: CANCELLED | OUTPATIENT
Start: 2023-06-27

## 2023-06-27 RX ORDER — CALCIUM CARBONATE 200(500)MG
500 TABLET,CHEWABLE ORAL 3 TIMES DAILY PRN
Status: CANCELLED | OUTPATIENT
Start: 2023-06-27

## 2023-06-27 RX ORDER — ACETAMINOPHEN 325 MG/1
650 TABLET ORAL EVERY 6 HOURS PRN
Status: CANCELLED | OUTPATIENT
Start: 2023-06-27

## 2023-06-27 RX ORDER — SIMETHICONE 80 MG
1 TABLET,CHEWABLE ORAL 4 TIMES DAILY PRN
Status: CANCELLED | OUTPATIENT
Start: 2023-06-27

## 2023-06-27 RX ORDER — BUTORPHANOL TARTRATE 1 MG/ML
2 INJECTION INTRAMUSCULAR; INTRAVENOUS
Status: CANCELLED | OUTPATIENT
Start: 2023-06-27

## 2023-06-27 RX ORDER — BUTORPHANOL TARTRATE 1 MG/ML
1 INJECTION INTRAMUSCULAR; INTRAVENOUS
Status: CANCELLED | OUTPATIENT
Start: 2023-06-27

## 2023-06-27 RX ORDER — MISOPROSTOL 100 MCG
25 TABLET ORAL EVERY 4 HOURS PRN
Status: CANCELLED | OUTPATIENT
Start: 2023-06-27

## 2023-06-27 NOTE — PROGRESS NOTES
T: 97.4     02%:  99    32 y.o. female  at 38w5d   She c/o cramping and back pressure for the past couple of days with mucous discharge. Denies any other problems.   Reports good fetal movement or fluttering. Denies any vaginal bleeding, leakage of fluid, cramping, contractions, or pressure. Requests an induction  Total weight gain/weight loss in pregnancy: 14.2 kg (31 lb 3.6 oz)   BPP 6/8 wth 0 for breathing- to hospital for NST/ evaluation. Dr. Buchannon called and notified.   US today: EFW 7 pounds 3 ounces, moderate hydroceles noted bilaterally.  Vitals  BP: 105/71  Pulse: 92  Weight: 115.5 kg (254 lb 9.6 oz)  Prenatal  Fundal Height (cm): 38 cm  Fetal Heart Rate: 151  Movement: Present  Urine Albumin/Glucose  Urine Albumin: 1+  Urine Glucose: Negative  Edema  LLE Edema: None  RLE Edema: None  Facial: None  Additional Edema?: No  Cervical Exam  Dilation: 4  Effacement (%): 70  Station: -3  Presentation: Vertex  Station (Labor Curve): 8 cm  Dilation/Effacement/Station  Dilation: 4  Effacement (%): 70  Station: -3    Prenatal Labs:  Lab Results   Component Value Date    GROUPTRH B POS 2022    HGB 10.7 (L) 2023    HCT 33.9 (L) 2023     (L) 2023    RUBELLAIMMUN Immune 2022    HEPBSAG Negative 2022    [id number] None-Reactive 2022    RPR Non-Reactive 2022    LABNGO Negative 2023    OBGLUCOSESCR 100 2023       A: 38w5d           ICD-10-CM ICD-9-CM    1. 38 weeks gestation of pregnancy  Z3A.38 V22.2 POCT URINALYSIS      2. History of thrombocytopenia  Z86.2 V12.3 CBC Auto Differential      CBC Auto Differential      IP OB Labor Induction      3. Encounter for elective induction of labor  Z34.90 V22.1 Admit to Inpatient      Full code      Vital Signs      Vital signs- Early Labor(0-4 cm)      Vital Signs- Active Labor (4-10 cm)      Vital Signs Post Delivery      Check Temperature, Membranes Intact      Check Temperature, Membranes Ruptured       Pulse Oximetry Continous while CONTINUOUS electronic fetal Monitor in use      Cervical Exam      Fetal / Uterine Monitoring      Fetal monitoring - scalp electrode      Insert peripheral IV      Straight Cath      Dumont to Lyles      Dumont Catheter Care every 12 hours      Nurse to discontinue dumont when patient no longer meets criteria      Post Dumont Catheter Removal Protocol      Decrease Oxytocin      Discontinue oxytocin      Oxytocin Titration Resumption      Amnioinfusion PRN recurrent variable decelerations      Administer a 500 -1000 ml IV fluid bolus as needed for      Assess fundal height/uterine firmness, lochia, episiotomy      Notify Physician      Notify physician (specify)      Notify physician       Notify physician       Notify Pediatrician immediately after delivery      Abdominal prep for  Section      Chlorhexidine (CHG) 2% Wipes      Chlorohexidine Gluconate Bath      External fetal and uterine  monitoring      Vital signs every 15 minutes      Saline lock IV      Perform baseline 20 minute non stress test - proceed with other orders if reactive criteria met.      miSOPROStol split tablet 25 mcg      Maternal vital signs, fetal and uterine assessments after each dose and hourly      Electronic fetal monitoring      May ambulate 30 minutes after insertion of Misoprostol (Cytotec)      Withhold Misoprostol (Cytotec) dosing:      Oxytocin should be delayed until at least 4 hours after the last Misoprostol (Cytotec) dose administered      Urinalysis, Reflex to Urine Culture      Urinalysis      CBC with Auto Differential      Comprehensive metabolic panel      Protime-INR      APTT      Fibrinogen      HIV 1/2 Ag/Ab (4th Gen)      Hepatitis B surface antigen      POCT Cord Blood Gas Umbilical Artery Cord Gas      POCT Cord Blood Gas Umbilical Vein Cord Gas      CBC with Auto Differential      Comprehensive metabolic panel      Type & Screen      Syphilis Antibody (RPR)      Protime-INR       APTT      Fibrinogen      Hepatitis B surface antigen      POCT Cord Blood Gas Umbilical Artery Cord Gas      POCT Cord Blood Gas Umbilical Vein Cord Gas      CBC auto differential      Type & Screen      Rubella antibody, IgG      Syphillis Antibody (RPR)      Hepatitis B surface antigen      Inpatient consult to Anesthesiology      Place MARII hose      4. BMI 45.0-49.9, adult  Z68.42 V85.42           P: Bleeding, daily fetal kick counts, and  labor/labor precautions discussed.  Risks of induction discussed to include hyperstimulation, non reassuring FHTs, abruption, fetal demise, uterine rupture, risk of induction failure, risk of  section and/or possible surgical interventions to stop bleeding to include dilatation and curretage or hysterectomy.   Induction scheduled for 23 @ 0001 for pitocin induction of labor  No pregnancy checklist tasks were completed during this visit, and no tasks are pending completion.      Questions answered to desired level of satisfaction  Verbalized understanding to all information and instructions provided.  Follow up in about 3 weeks (around 2023), or if symptoms worsen or fail to improve, for postpartum visit/exam.    Wilda Augustin, BISI, CNM, WHNP-BC

## 2023-06-28 DIAGNOSIS — D64.9 ANEMIA, UNSPECIFIED TYPE: ICD-10-CM

## 2023-06-28 RX ORDER — FERROUS SULFATE 325(65) MG
325 TABLET, DELAYED RELEASE (ENTERIC COATED) ORAL
Qty: 60 TABLET | Refills: 4 | Status: SHIPPED | OUTPATIENT
Start: 2023-06-28

## 2023-06-29 ENCOUNTER — ANESTHESIA EVENT (OUTPATIENT)
Dept: OBSTETRICS AND GYNECOLOGY | Facility: HOSPITAL | Age: 33
End: 2023-06-29
Payer: COMMERCIAL

## 2023-06-29 ENCOUNTER — ANESTHESIA (OUTPATIENT)
Dept: OBSTETRICS AND GYNECOLOGY | Facility: HOSPITAL | Age: 33
End: 2023-06-29
Payer: COMMERCIAL

## 2023-06-29 ENCOUNTER — HOSPITAL ENCOUNTER (INPATIENT)
Facility: HOSPITAL | Age: 33
LOS: 2 days | Discharge: HOME | End: 2023-07-01
Attending: OBSTETRICS & GYNECOLOGY | Admitting: OBSTETRICS & GYNECOLOGY
Payer: COMMERCIAL

## 2023-06-29 DIAGNOSIS — Z3A.38 38 WEEKS GESTATION OF PREGNANCY: ICD-10-CM

## 2023-06-29 DIAGNOSIS — Z34.90 ENCOUNTER FOR ELECTIVE INDUCTION OF LABOR: ICD-10-CM

## 2023-06-29 PROBLEM — D69.6 THROMBOCYTOPENIA: Status: ACTIVE | Noted: 2023-06-29

## 2023-06-29 LAB
ABORH RETYPE: NORMAL
ALBUMIN SERPL BCP-MCNC: 2.4 G/DL (ref 3.5–5)
ALBUMIN/GLOB SERPL: 0.6 {RATIO}
ALP SERPL-CCNC: 160 U/L (ref 37–98)
ALT SERPL W P-5'-P-CCNC: 3 U/L (ref 13–56)
ANION GAP SERPL CALCULATED.3IONS-SCNC: 15 MMOL/L (ref 7–16)
APTT PPP: 27.8 SECONDS (ref 25.2–37.3)
AST SERPL W P-5'-P-CCNC: 3 U/L (ref 15–37)
BASOPHILS # BLD AUTO: 0.01 K/UL (ref 0–0.2)
BASOPHILS NFR BLD AUTO: 0.1 % (ref 0–1)
BILIRUB SERPL-MCNC: 0.4 MG/DL (ref ?–1.2)
BILIRUB UR QL STRIP: NEGATIVE
BUN SERPL-MCNC: 10 MG/DL (ref 7–18)
BUN/CREAT SERPL: 16 (ref 6–20)
CALCIUM SERPL-MCNC: 8.7 MG/DL (ref 8.5–10.1)
CHLORIDE SERPL-SCNC: 106 MMOL/L (ref 98–107)
CLARITY UR: CLEAR
CO2 SERPL-SCNC: 21 MMOL/L (ref 21–32)
COLOR UR: NORMAL
CREAT SERPL-MCNC: 0.63 MG/DL (ref 0.55–1.02)
DIFFERENTIAL METHOD BLD: ABNORMAL
EGFR (NO RACE VARIABLE) (RUSH/TITUS): 122 ML/MIN/1.73M2
EOSINOPHIL # BLD AUTO: 0.03 K/UL (ref 0–0.5)
EOSINOPHIL NFR BLD AUTO: 0.4 % (ref 1–4)
ERYTHROCYTE [DISTWIDTH] IN BLOOD BY AUTOMATED COUNT: 15.2 % (ref 11.5–14.5)
FSP TITR PPP LA: 545 MG/DL (ref 283–506)
GLOBULIN SER-MCNC: 4.3 G/DL (ref 2–4)
GLUCOSE SERPL-MCNC: 93 MG/DL (ref 74–106)
GLUCOSE UR STRIP-MCNC: NORMAL MG/DL
HBV SURFACE AG SERPL QL IA: NORMAL
HCT VFR BLD AUTO: 33.6 % (ref 38–47)
HGB BLD-MCNC: 11 G/DL (ref 12–16)
IMM GRANULOCYTES # BLD AUTO: 0.03 K/UL (ref 0–0.04)
IMM GRANULOCYTES NFR BLD: 0.4 % (ref 0–0.4)
INDIRECT COOMBS: NORMAL
INR BLD: 1.09
KETONES UR STRIP-SCNC: NEGATIVE MG/DL
LEUKOCYTE ESTERASE UR QL STRIP: NEGATIVE
LYMPHOCYTES # BLD AUTO: 2.72 K/UL (ref 1–4.8)
LYMPHOCYTES NFR BLD AUTO: 32.5 % (ref 27–41)
MCH RBC QN AUTO: 28.2 PG (ref 27–31)
MCHC RBC AUTO-ENTMCNC: 32.7 G/DL (ref 32–36)
MCV RBC AUTO: 86.2 FL (ref 80–96)
MONOCYTES # BLD AUTO: 0.42 K/UL (ref 0–0.8)
MONOCYTES NFR BLD AUTO: 5 % (ref 2–6)
MPC BLD CALC-MCNC: 12.9 FL (ref 9.4–12.4)
NEUTROPHILS # BLD AUTO: 5.17 K/UL (ref 1.8–7.7)
NEUTROPHILS NFR BLD AUTO: 61.6 % (ref 53–65)
NITRITE UR QL STRIP: NEGATIVE
NRBC # BLD AUTO: 0 X10E3/UL
NRBC, AUTO (.00): 0 %
PH UR STRIP: 6.5 PH UNITS
PLATELET # BLD AUTO: 114 K/UL (ref 150–400)
POTASSIUM SERPL-SCNC: 3.8 MMOL/L (ref 3.5–5.1)
PROT SERPL-MCNC: 6.7 G/DL (ref 6.4–8.2)
PROT UR QL STRIP: NEGATIVE
PROTHROMBIN TIME: 13.7 SECONDS (ref 11.7–14.7)
RBC # BLD AUTO: 3.9 M/UL (ref 4.2–5.4)
RBC # UR STRIP: NEGATIVE /UL
RH BLD: NORMAL
RUBV IGG SER-ACNC: 33.1 IU/ML
SODIUM SERPL-SCNC: 138 MMOL/L (ref 136–145)
SP GR UR STRIP: 1.02
SPECIMEN OUTDATE: NORMAL
SYPHILIS AB INTERPRETATION: NORMAL
UROBILINOGEN UR STRIP-ACNC: NORMAL MG/DL
WBC # BLD AUTO: 8.38 K/UL (ref 4.5–11)

## 2023-06-29 PROCEDURE — 85025 COMPLETE CBC W/AUTO DIFF WBC: CPT | Performed by: ADVANCED PRACTICE MIDWIFE

## 2023-06-29 PROCEDURE — 72100003 HC LABOR CARE, EA. ADDL. 8 HRS

## 2023-06-29 PROCEDURE — 86900 BLOOD TYPING SEROLOGIC ABO: CPT | Performed by: ADVANCED PRACTICE MIDWIFE

## 2023-06-29 PROCEDURE — 85730 THROMBOPLASTIN TIME PARTIAL: CPT | Performed by: ADVANCED PRACTICE MIDWIFE

## 2023-06-29 PROCEDURE — 87340 HEPATITIS B SURFACE AG IA: CPT | Performed by: ADVANCED PRACTICE MIDWIFE

## 2023-06-29 PROCEDURE — 72100002 HC LABOR CARE, 1ST 8 HOURS

## 2023-06-29 PROCEDURE — C1751 CATH, INF, PER/CENT/MIDLINE: HCPCS | Performed by: ANESTHESIOLOGY

## 2023-06-29 PROCEDURE — 86762 RUBELLA ANTIBODY: CPT | Performed by: ADVANCED PRACTICE MIDWIFE

## 2023-06-29 PROCEDURE — 62326 NJX INTERLAMINAR LMBR/SAC: CPT | Mod: AA | Performed by: ANESTHESIOLOGY

## 2023-06-29 PROCEDURE — 59400 PRA FULL ROUT OBSTE CARE,VAGINAL DELIV: ICD-10-PCS | Mod: AA,P3,, | Performed by: ANESTHESIOLOGY

## 2023-06-29 PROCEDURE — 80053 COMPREHEN METABOLIC PANEL: CPT | Performed by: ADVANCED PRACTICE MIDWIFE

## 2023-06-29 PROCEDURE — 59400 OBSTETRICAL CARE: CPT | Mod: TH,,, | Performed by: ADVANCED PRACTICE MIDWIFE

## 2023-06-29 PROCEDURE — 63600175 PHARM REV CODE 636 W HCPCS: Performed by: ADVANCED PRACTICE MIDWIFE

## 2023-06-29 PROCEDURE — 59400 OBSTETRICAL CARE: CPT | Mod: AA,P3,, | Performed by: ANESTHESIOLOGY

## 2023-06-29 PROCEDURE — 25000003 PHARM REV CODE 250: Performed by: ANESTHESIOLOGY

## 2023-06-29 PROCEDURE — 59400 PR FULL ROUT OBSTE CARE,VAGINAL DELIV: ICD-10-PCS | Mod: TH,,, | Performed by: ADVANCED PRACTICE MIDWIFE

## 2023-06-29 PROCEDURE — 63600175 PHARM REV CODE 636 W HCPCS: Performed by: ANESTHESIOLOGY

## 2023-06-29 PROCEDURE — 72200005 HC VAGINAL DELIVERY LEVEL II

## 2023-06-29 PROCEDURE — 85384 FIBRINOGEN ACTIVITY: CPT | Performed by: ADVANCED PRACTICE MIDWIFE

## 2023-06-29 PROCEDURE — 25000003 PHARM REV CODE 250: Performed by: ADVANCED PRACTICE MIDWIFE

## 2023-06-29 PROCEDURE — 81003 URINALYSIS AUTO W/O SCOPE: CPT | Performed by: ADVANCED PRACTICE MIDWIFE

## 2023-06-29 PROCEDURE — 11000001 HC ACUTE MED/SURG PRIVATE ROOM

## 2023-06-29 PROCEDURE — 85610 PROTHROMBIN TIME: CPT | Performed by: ADVANCED PRACTICE MIDWIFE

## 2023-06-29 PROCEDURE — 86780 TREPONEMA PALLIDUM: CPT | Performed by: ADVANCED PRACTICE MIDWIFE

## 2023-06-29 RX ORDER — LIDOCAINE HYDROCHLORIDE 10 MG/ML
10 INJECTION INFILTRATION; PERINEURAL ONCE AS NEEDED
Status: DISCONTINUED | OUTPATIENT
Start: 2023-06-29 | End: 2023-06-30

## 2023-06-29 RX ORDER — MISOPROSTOL 200 UG/1
800 TABLET ORAL ONCE AS NEEDED
Status: DISCONTINUED | OUTPATIENT
Start: 2023-06-29 | End: 2023-07-01 | Stop reason: HOSPADM

## 2023-06-29 RX ORDER — IBUPROFEN 800 MG/1
800 TABLET ORAL EVERY 8 HOURS PRN
Qty: 60 TABLET | Refills: 1 | Status: SHIPPED | OUTPATIENT
Start: 2023-06-29

## 2023-06-29 RX ORDER — SIMETHICONE 80 MG
1 TABLET,CHEWABLE ORAL EVERY 6 HOURS PRN
Status: DISCONTINUED | OUTPATIENT
Start: 2023-06-29 | End: 2023-07-01 | Stop reason: HOSPADM

## 2023-06-29 RX ORDER — FAMOTIDINE 10 MG/ML
20 INJECTION INTRAVENOUS ONCE
Status: DISCONTINUED | OUTPATIENT
Start: 2023-06-29 | End: 2023-06-30

## 2023-06-29 RX ORDER — METHYLERGONOVINE MALEATE 0.2 MG/ML
200 INJECTION INTRAVENOUS ONCE
Status: DISCONTINUED | OUTPATIENT
Start: 2023-06-29 | End: 2023-07-01

## 2023-06-29 RX ORDER — SIMETHICONE 80 MG
1 TABLET,CHEWABLE ORAL 4 TIMES DAILY PRN
Status: DISCONTINUED | OUTPATIENT
Start: 2023-06-29 | End: 2023-07-01 | Stop reason: HOSPADM

## 2023-06-29 RX ORDER — DIPHENHYDRAMINE HCL 25 MG
25 CAPSULE ORAL EVERY 4 HOURS PRN
Status: DISCONTINUED | OUTPATIENT
Start: 2023-06-29 | End: 2023-07-01 | Stop reason: HOSPADM

## 2023-06-29 RX ORDER — EPHEDRINE SULFATE 50 MG/ML
10 INJECTION, SOLUTION INTRAVENOUS ONCE AS NEEDED
Status: DISCONTINUED | OUTPATIENT
Start: 2023-06-29 | End: 2023-06-30

## 2023-06-29 RX ORDER — ONDANSETRON 2 MG/ML
4 INJECTION INTRAMUSCULAR; INTRAVENOUS ONCE
Status: DISCONTINUED | OUTPATIENT
Start: 2023-06-29 | End: 2023-06-30

## 2023-06-29 RX ORDER — SODIUM CHLORIDE, SODIUM LACTATE, POTASSIUM CHLORIDE, CALCIUM CHLORIDE 600; 310; 30; 20 MG/100ML; MG/100ML; MG/100ML; MG/100ML
INJECTION, SOLUTION INTRAVENOUS CONTINUOUS
Status: DISCONTINUED | OUTPATIENT
Start: 2023-06-29 | End: 2023-07-01

## 2023-06-29 RX ORDER — SODIUM CITRATE AND CITRIC ACID MONOHYDRATE 334; 500 MG/5ML; MG/5ML
30 SOLUTION ORAL ONCE
Status: DISCONTINUED | OUTPATIENT
Start: 2023-06-29 | End: 2023-06-30

## 2023-06-29 RX ORDER — ONDANSETRON 4 MG/1
8 TABLET, ORALLY DISINTEGRATING ORAL EVERY 8 HOURS PRN
Status: DISCONTINUED | OUTPATIENT
Start: 2023-06-29 | End: 2023-07-01 | Stop reason: HOSPADM

## 2023-06-29 RX ORDER — CALCIUM CARBONATE 200(500)MG
500 TABLET,CHEWABLE ORAL 3 TIMES DAILY PRN
Status: DISCONTINUED | OUTPATIENT
Start: 2023-06-29 | End: 2023-07-01 | Stop reason: HOSPADM

## 2023-06-29 RX ORDER — OXYTOCIN/RINGER'S LACTATE 30/500 ML
0-30 PLASTIC BAG, INJECTION (ML) INTRAVENOUS CONTINUOUS
Status: DISCONTINUED | OUTPATIENT
Start: 2023-06-29 | End: 2023-06-29

## 2023-06-29 RX ORDER — ROPIVACAINE HYDROCHLORIDE 5 MG/ML
INJECTION, SOLUTION EPIDURAL; INFILTRATION; PERINEURAL
Status: COMPLETED | OUTPATIENT
Start: 2023-06-29 | End: 2023-06-29

## 2023-06-29 RX ORDER — FENTANYL/ROPIVACAINE/NS/PF 2MCG/ML-.2
PLASTIC BAG, INJECTION (ML) INJECTION CONTINUOUS
Status: DISCONTINUED | OUTPATIENT
Start: 2023-06-29 | End: 2023-06-30

## 2023-06-29 RX ORDER — FAMOTIDINE 10 MG/ML
20 INJECTION INTRAVENOUS
Status: DISCONTINUED | OUTPATIENT
Start: 2023-06-29 | End: 2023-06-30

## 2023-06-29 RX ORDER — DIPHENHYDRAMINE HYDROCHLORIDE 50 MG/ML
25 INJECTION INTRAMUSCULAR; INTRAVENOUS EVERY 4 HOURS PRN
Status: DISCONTINUED | OUTPATIENT
Start: 2023-06-29 | End: 2023-07-01 | Stop reason: HOSPADM

## 2023-06-29 RX ORDER — HYDROCORTISONE 25 MG/G
CREAM TOPICAL 3 TIMES DAILY PRN
Status: DISCONTINUED | OUTPATIENT
Start: 2023-06-29 | End: 2023-07-01 | Stop reason: HOSPADM

## 2023-06-29 RX ORDER — OXYTOCIN/RINGER'S LACTATE 30/500 ML
95 PLASTIC BAG, INJECTION (ML) INTRAVENOUS ONCE
Status: DISCONTINUED | OUTPATIENT
Start: 2023-06-29 | End: 2023-06-30

## 2023-06-29 RX ORDER — SODIUM CHLORIDE 9 MG/ML
INJECTION, SOLUTION INTRAVENOUS
Status: DISCONTINUED | OUTPATIENT
Start: 2023-06-29 | End: 2023-06-29

## 2023-06-29 RX ORDER — DOCUSATE SODIUM 100 MG/1
200 CAPSULE, LIQUID FILLED ORAL 2 TIMES DAILY PRN
Status: DISCONTINUED | OUTPATIENT
Start: 2023-06-29 | End: 2023-07-01 | Stop reason: HOSPADM

## 2023-06-29 RX ORDER — SODIUM CITRATE AND CITRIC ACID MONOHYDRATE 334; 500 MG/5ML; MG/5ML
30 SOLUTION ORAL
Status: DISCONTINUED | OUTPATIENT
Start: 2023-06-29 | End: 2023-06-30

## 2023-06-29 RX ORDER — HYDROCODONE BITARTRATE AND ACETAMINOPHEN 5; 325 MG/1; MG/1
1 TABLET ORAL EVERY 4 HOURS PRN
Status: DISCONTINUED | OUTPATIENT
Start: 2023-06-29 | End: 2023-07-01 | Stop reason: HOSPADM

## 2023-06-29 RX ORDER — CARBOPROST TROMETHAMINE 250 UG/ML
250 INJECTION, SOLUTION INTRAMUSCULAR ONCE AS NEEDED
Status: DISCONTINUED | OUTPATIENT
Start: 2023-06-29 | End: 2023-07-01 | Stop reason: HOSPADM

## 2023-06-29 RX ORDER — ACETAMINOPHEN 325 MG/1
650 TABLET ORAL EVERY 6 HOURS PRN
Status: DISCONTINUED | OUTPATIENT
Start: 2023-06-29 | End: 2023-07-01 | Stop reason: HOSPADM

## 2023-06-29 RX ORDER — IBUPROFEN 800 MG/1
800 TABLET ORAL EVERY 8 HOURS PRN
Status: DISCONTINUED | OUTPATIENT
Start: 2023-06-29 | End: 2023-07-01 | Stop reason: HOSPADM

## 2023-06-29 RX ORDER — DIPHENHYDRAMINE HYDROCHLORIDE 50 MG/ML
12.5 INJECTION INTRAMUSCULAR; INTRAVENOUS EVERY 4 HOURS PRN
Status: DISCONTINUED | OUTPATIENT
Start: 2023-06-29 | End: 2023-06-30

## 2023-06-29 RX ORDER — PRENATAL WITH FERROUS FUM AND FOLIC ACID 3080; 920; 120; 400; 22; 1.84; 3; 20; 10; 1; 12; 200; 27; 25; 2 [IU]/1; [IU]/1; MG/1; [IU]/1; MG/1; MG/1; MG/1; MG/1; MG/1; MG/1; UG/1; MG/1; MG/1; MG/1; MG/1
1 TABLET ORAL DAILY
Status: DISCONTINUED | OUTPATIENT
Start: 2023-06-29 | End: 2023-07-01 | Stop reason: HOSPADM

## 2023-06-29 RX ORDER — PROCHLORPERAZINE EDISYLATE 5 MG/ML
5 INJECTION INTRAMUSCULAR; INTRAVENOUS EVERY 6 HOURS PRN
Status: DISCONTINUED | OUTPATIENT
Start: 2023-06-29 | End: 2023-07-01 | Stop reason: HOSPADM

## 2023-06-29 RX ORDER — ACETAMINOPHEN 325 MG/1
650 TABLET ORAL EVERY 6 HOURS PRN
Status: DISCONTINUED | OUTPATIENT
Start: 2023-06-29 | End: 2023-06-30

## 2023-06-29 RX ORDER — TERBUTALINE SULFATE 1 MG/ML
0.25 INJECTION SUBCUTANEOUS
Status: DISCONTINUED | OUTPATIENT
Start: 2023-06-29 | End: 2023-06-30

## 2023-06-29 RX ORDER — ONDANSETRON 2 MG/ML
4 INJECTION INTRAMUSCULAR; INTRAVENOUS EVERY 4 HOURS PRN
Status: DISCONTINUED | OUTPATIENT
Start: 2023-06-29 | End: 2023-06-30

## 2023-06-29 RX ORDER — HYDROCODONE BITARTRATE AND ACETAMINOPHEN 7.5; 325 MG/1; MG/1
1 TABLET ORAL EVERY 4 HOURS PRN
Status: DISCONTINUED | OUTPATIENT
Start: 2023-06-29 | End: 2023-07-01 | Stop reason: HOSPADM

## 2023-06-29 RX ADMIN — ROPIVACAINE HYDROCHLORIDE 5 ML: 5 INJECTION, SOLUTION EPIDURAL; INFILTRATION; PERINEURAL at 09:06

## 2023-06-29 RX ADMIN — SODIUM CHLORIDE, POTASSIUM CHLORIDE, SODIUM LACTATE AND CALCIUM CHLORIDE: 600; 310; 30; 20 INJECTION, SOLUTION INTRAVENOUS at 01:06

## 2023-06-29 RX ADMIN — Medication: at 08:06

## 2023-06-29 RX ADMIN — DOCUSATE SODIUM 200 MG: 100 CAPSULE, LIQUID FILLED ORAL at 08:06

## 2023-06-29 RX ADMIN — SODIUM CHLORIDE, POTASSIUM CHLORIDE, SODIUM LACTATE AND CALCIUM CHLORIDE: 600; 310; 30; 20 INJECTION, SOLUTION INTRAVENOUS at 07:06

## 2023-06-29 RX ADMIN — SODIUM CHLORIDE, POTASSIUM CHLORIDE, SODIUM LACTATE AND CALCIUM CHLORIDE: 600; 310; 30; 20 INJECTION, SOLUTION INTRAVENOUS at 09:06

## 2023-06-29 RX ADMIN — FENTANYL CITRATE 500 MCG: 0.05 INJECTION, SOLUTION INTRAMUSCULAR; INTRAVENOUS at 09:06

## 2023-06-29 RX ADMIN — IBUPROFEN 800 MG: 800 TABLET, FILM COATED ORAL at 01:06

## 2023-06-29 RX ADMIN — HYDROCODONE BITARTRATE AND ACETAMINOPHEN 1 TABLET: 5; 325 TABLET ORAL at 08:06

## 2023-06-29 RX ADMIN — IBUPROFEN 800 MG: 800 TABLET, FILM COATED ORAL at 11:06

## 2023-06-29 RX ADMIN — ROPIVACAINE HYDROCHLORIDE 3 ML: 5 INJECTION, SOLUTION EPIDURAL; INFILTRATION; PERINEURAL at 09:06

## 2023-06-29 RX ADMIN — Medication 2 MILLI-UNITS/MIN: at 01:06

## 2023-06-29 NOTE — PLAN OF CARE
Problem:  Fall Injury Risk  Goal: Absence of Fall, Infant Drop and Related Injury  Outcome: Ongoing, Progressing     Problem: Adult Inpatient Plan of Care  Goal: Plan of Care Review  Outcome: Ongoing, Progressing  Goal: Patient-Specific Goal (Individualized)  Outcome: Ongoing, Progressing  Goal: Absence of Hospital-Acquired Illness or Injury  Outcome: Ongoing, Progressing  Goal: Optimal Comfort and Wellbeing  Outcome: Ongoing, Progressing  Goal: Readiness for Transition of Care  Outcome: Ongoing, Progressing     Problem: Bariatric Environmental Safety  Goal: Safety Maintained with Care  Outcome: Ongoing, Progressing     Problem: Infection  Goal: Absence of Infection Signs and Symptoms  Outcome: Ongoing, Progressing     Problem: Bleeding (Labor)  Goal: Hemostasis  Outcome: Ongoing, Progressing     Problem: Change in Fetal Wellbeing (Labor)  Goal: Stable Fetal Wellbeing  Outcome: Ongoing, Progressing     Problem: Delayed Labor Progression (Labor)  Goal: Effective Progression to Delivery  Outcome: Ongoing, Progressing     Problem: Infection (Labor)  Goal: Absence of Infection Signs and Symptoms  Outcome: Ongoing, Progressing     Problem: Labor Pain (Labor)  Goal: Acceptable Pain Control  Outcome: Ongoing, Progressing     Problem: Uterine Tachysystole (Labor)  Goal: Normal Uterine Contraction Pattern  Outcome: Ongoing, Progressing   POC discussed and review, pt voices no problems or concerns at this time

## 2023-06-29 NOTE — H&P
[facility] -  Labor and Delivery  Obstetrics  Labor Progress Note    Patient Name: Clair Dang  MRN: [id number]  Admission Date: 2023  Hospital Length of Stay: 0 days  Attending Physician: Abbey Gu MD  Primary Care Provider: Primary Doctor No    Subjective:     Principal Problem:Encounter for elective induction of labor    Interval History:  Clair is a 32 y.o.  at 39w0d. She is doing well.     Objective:     Vital Signs (Most Recent):  Temp: 97.3 °F (36.3 °C) (23 0709)  Pulse: 69 (23 0755)  Resp: 17 (23 07)  BP: 122/88 (23 0745)  SpO2: 99 % (23 0755) Vital Signs (24h Range):  Temp:  [97.3 °F (36.3 °C)-97.5 °F (36.4 °C)] 97.3 °F (36.3 °C)  Pulse:  [] 69  Resp:  [16-17] 17  SpO2:  [96 %-100 %] 99 %  BP: ()/(61-88) 122/88     Weight: 116.7 kg (257 lb 3.2 oz)  Body mass index is 45.56 kg/m².    FHT: 120s Cat 1 (reassuring)  TOCO:  Q 2-4 minutes    Physical Exam    Cervical Exam:  Dilation:  4  Effacement:  80%  Station: -2  Presentation: Vertex   AROM with amniohook- clear fluid noted  IUPC inserted with ease with clear flashback noted    Significant Labs:  Lab Results   Component Value Date    GROUPTRH B POS 2023    HEPBSAG Non-Reactive 2023       CBC:   Recent Labs   Lab 23  0054   WBC 8.38   RBC 3.90*   HGB 11.0*   HCT 33.6*   *   MCV 86.2   MCH 28.2   MCHC 32.7     I have personallly reviewed all pertinent lab results from the last 24 hours.  Recent Lab Results         23  0115   23  0054   23  0036        Albumin/Globulin Ratio   0.6         ABO and RH B POS           Albumin   2.4         Alkaline Phosphatase   160         ALT   3         Anion Gap   15         Appearance, UA     Clear       aPTT   27.8         AST   3         Baso #   0.01         Basophil %   0.1         Bilirubin (UA)     Negative       BILIRUBIN TOTAL   0.4         BUN   10         BUN/CREAT RATIO   16         Calcium    8.7         Chloride   106         CO2   21         Color, UA     Light Yellow       Creatinine   0.63         Differential Type   Auto         eGFR   122         Eos #   0.03         Eosinophil %   0.4         Fibrinogen   545         Globulin, Total   4.3         Glucose   93         Glucose, UA     Normal       Group & Rh   B POS         Hematocrit   33.6         Hemoglobin   11.0         Hepatitis B Surface Ag   Non-Reactive         Immature Grans (Abs)   0.03         Immature Granulocytes   0.4         INDIRECT VIJI   NEG         INR   1.09         Ketones, UA     Negative       Leukocytes, UA     Negative       Lymph #   2.72         Lymph %   32.5         MCH   28.2         MCHC   32.7         MCV   86.2         Mono #   0.42         Mono %   5.0         MPV   12.9         Neutrophils, Abs   5.17         Neutrophils Relative   61.6         NITRITE UA     Negative       nRBC   0.0         NUCLEATED RBC ABSOLUTE   0.00         Occult Blood UA     Negative       pH, UA     6.5       Platelets   114         Potassium   3.8         PROTEIN TOTAL   6.7         Protein, UA     Negative       Protime   13.7         RBC   3.90         RDW   15.2         Rubella IgG Antibodies   33.1  Comment:   <5:              Non-Immune  5.0-9.9:       Equivocal  >=10:          Immune         Sodium   138         Specific Gravity, UA     1.020       Specimen Outdate   2023 23:59         Syphilis Ab Interpretation   Non-Reactive  Comment: 0.0 - 0.9: Non-Reactive  0.91 - 1.10: Equivocal with RPR to follow  >1.10:  Reactive with RPR to Follow         UROBILINOGEN UA     Normal       WBC   8.38                 Assessment/Plan:     32 y.o. female  at 39w0d for:    Active Diagnoses:    Diagnosis Date Noted POA    PRINCIPAL PROBLEM:  Encounter for elective induction of labor [Z34.90] 2023 Not Applicable    Thrombocytopenia [D69.6] 2023 Unknown      Problems Resolved During this Admission:       Continue with  pitocin induction of labor  Reposition for comfort  May have analgesics if desired  Monitor maternal and fetal status  Verbalized understanding to all instructions and information  Questions answered to desired level of satisfaction      Wilda Augustin, OSCAR  Obstetrics  [facility] -  Labor and Delivery

## 2023-06-29 NOTE — ANESTHESIA PROCEDURE NOTES
Epidural    Patient location during procedure: OB   Reason for block: primary anesthetic   Reason for block: labor analgesia requested by patient and obstetrician  Diagnosis: iup, iol   Start time: 6/29/2023 9:01 AM  Timeout: 6/29/2023 9:00 AM  End time: 6/29/2023 9:16 AM    Staffing  Performing Provider: Brice Hatfield DO  Authorizing Provider: Brice Hatfield DO        Preanesthetic Checklist  Completed: patient identified, IV checked, site marked, risks and benefits discussed, surgical consent, monitors and equipment checked, pre-op evaluation, timeout performed, anesthesia consent given, hand hygiene performed and patient being monitored  Preparation  Patient position: sitting  Prep: ChloraPrep  Patient monitoring: ECG, Pulse Ox and Blood Pressure  Reason for block: primary anesthetic   Epidural  Skin Anesthetic: lidocaine 1%  Skin Wheal: 3 mL  Administration type: continuous  Approach: midline  Interspace: L4-5    Injection technique: MAREK saline  Needle and Epidural Catheter  Needle type: Tuohy   Needle gauge: 18  Needle length: 3.5 inches  Needle insertion depth: 8 cm  Catheter type: end hole  Catheter size: 20 G  Catheter at skin depth: 12 cm  Insertion Attempts: 1  Test dose: 3 mL of lidocaine 1.5% with Epi 1-to-200,000  Additional Documentation: incremental injection, no paresthesia on injection and negative aspiration for heme and CSF  Needle localization: anatomical landmarks  Assessment  Ease of block: moderate  Patient's tolerance of the procedure: comfortable throughout block No inadvertent dural puncture with Tuohy.  Dural puncture not performed with spinal needle    Medications:    Medications: ROPIvacaine (NAROPIN) injection 0.5% - Epidural   5 mL - 6/29/2023 9:10:00 AM

## 2023-06-29 NOTE — ANESTHESIA PREPROCEDURE EVALUATION
06/29/2023  Clair Dang is a 32 y.o., female.      Pre-op Assessment    I have reviewed the Patient Summary Reports.     I have reviewed the Nursing Notes. I have reviewed the NPO Status.   I have reviewed the Medications.     Review of Systems  Anesthesia Hx:  No problems with previous Anesthesia  Denies Family Hx of Anesthesia complications.   Denies Personal Hx of Anesthesia complications.   Social:  Non-Smoker, No Alcohol Use    Cardiovascular:   Exercise tolerance: good    OB/GYN/PEDS:  Iup, iol       Endocrine:  Morbid Obesity / BMI > 40      Physical Exam  General: Well nourished, Cooperative and Alert    Airway:  Mallampati: II   Mouth Opening: Normal  TM Distance: Normal  Tongue: Normal  Neck ROM: Normal ROM    Dental:  Intact    Chest/Lungs:  Clear to auscultation, Normal Respiratory Rate    Heart:  Rate: Normal  Rhythm: Regular Rhythm        Chemistry        Component Value Date/Time     06/29/2023 0054    K 3.8 06/29/2023 0054     06/29/2023 0054    CO2 21 06/29/2023 0054    BUN 10 06/29/2023 0054    CREATININE 0.63 06/29/2023 0054    GLU 93 06/29/2023 0054    GLU 92 12/27/2022 0000        Component Value Date/Time    CALCIUM 8.7 06/29/2023 0054    ALKPHOS 160 (H) 06/29/2023 0054    AST 3 (L) 06/29/2023 0054    ALT 3 (L) 06/29/2023 0054    BILITOT 0.4 06/29/2023 0054        Lab Results   Component Value Date    WBC 8.38 06/29/2023    HGB 11.0 (L) 06/29/2023    HCT 33.6 (L) 06/29/2023     (L) 06/29/2023     No results found for this or any previous visit.      Anesthesia Plan  Type of Anesthesia, risks & benefits discussed:    Anesthesia Type: Epidural  Intra-op Monitoring Plan: Standard ASA Monitors  Post Op Pain Control Plan: epidural analgesia  Informed Consent: Informed consent signed with the Patient and all parties understand the risks and agree with anesthesia  plan.  All questions answered. Patient consented to blood products? Yes  ASA Score: 3  Day of Surgery Review of History & Physical: H&P Update referred to the surgeon/provider.I have interviewed and examined the patient. I have reviewed the patient's H&P dated: There are no significant changes.     Ready For Surgery From Anesthesia Perspective.     .

## 2023-06-29 NOTE — L&D DELIVERY NOTE
"[facility] -  Labor and Delivery  Vaginal Delivery   Operative Note    SUMMARY     Normal spontaneous vaginal delivery of live infant, was placed on mothers abdomen for skin to skin and bulb suctioning performed.  Infant delivered position TANIA over intact perineum.  Nuchal cord: Yes, cord reduced at perineum.    Spontaneous delivery of placenta and IV pitocin given noting good uterine tone.  Labial abrasions noted, no repair needed .  Patient tolerated delivery well. Sponge needle and lap counted correctly x2.    Indications: Encounter for elective induction of labor  Pregnancy complicated by:   Patient Active Problem List   Diagnosis    High-risk pregnancy in third trimester    Encounter for elective induction of labor    Thrombocytopenia     Admitting GA: 39w0d    Delivery Information for Hemal Dang    Birth information:  YOB: 2022   Time of birth: 10:24 AM   Sex: male   Head Delivery Date/Time: 6/29/2023 10:24 AM   Delivery type: Vaginal, Spontaneous   Gestational Age: 39w0d        Delivery Providers    Delivering clinician: Wilda Rondon CNM           Measurements    Weight: 3470 g  Weight (lbs): 7 lb 10.4 oz  Length: 49.5 cm  Length (in): 19.5"         Apgars    Living status: Living  Apgars:  1 min.:  5 min.:  10 min.:  15 min.:  20 min.:    Skin color:  1  1       Heart rate:  2  2       Reflex irritability:  2  2       Muscle tone:  2  2       Respiratory effort:  2  2       Total:  9  9       Apgars assigned by: BRITTANEY RONDON CNM/ANNALISA CORNEJO RN         Operative Delivery    Forceps attempted?: No  Vacuum extractor attempted?: No         Shoulder Dystocia    Shoulder dystocia present?: No           Presentation    Presentation: Vertex  Position: Left Occiput Anterior           Interventions/Resuscitation    Method: Bulb Suctioning, Tactile Stimulation       Cord    Vessels: 3 vessels  Complications: None  Delayed Cord Clamping?: Yes  Cord Blood Disposition: Sent with Baby  Gases " Sent?: No  Stem Cell Collection (by MD): No       Placenta    Placenta delivery date/time: 2023 1028  Placenta removal: Spontaneous  Placenta appearance: Intact  Placenta disposition: Pathology           Labor Events:       labor: No     Labor Onset Date/Time: 2023 01:55     Dilation Complete Date/Time: 2023 10:04     Start Pushing Date/Time: 2023 10:24     Rupture Date/Time: 23  0835         Rupture type: ARM (Artificial Rupture)         Fluid Amount:       Fluid Color: Clear       steroids: None     Antibiotics given for GBS: No     Induction: amniotomy;oxytocin     Indications for induction:  Elective     Augmentation:       Indications for augmentation:       Labor complications: None     Additional complications:          Cervical ripening:                     Delivery:      Episiotomy: None     Indication for Episiotomy:       Perineal Lacerations: None Repaired:      Periurethral Laceration: bilateral Repaired: No   Labial Laceration: bilateral Repaired: No   Sulcus Laceration:   Repaired:     Vaginal Laceration:   Repaired:     Cervical Laceration:   Repaired:     Repair suture: None     Repair # of packets:       Last Value - EBL - Nursing (mL):       Sum - EBL - Nursing (mL): 0     Last Value - EBL - Anesthesia (mL):      Calculated QBL (mL):       Vaginal Sweep Performed: Yes     Surgicount Correct: Yes       Other providers:       Anesthesia    Method: Epidural          Details (if applicable):  Trial of Labor      Categorization:      Priority:     Indications for :     Incision Type:       Additional  information:  Forceps:    Vacuum:    Breech:    Observed anomalies    Other (Comments):

## 2023-06-29 NOTE — ANESTHESIA POSTPROCEDURE EVALUATION
Anesthesia Post Evaluation    Patient: Clair Dang    Procedure(s) Performed: * No procedures listed *    Final Anesthesia Type: epidural      Patient location during evaluation: labor & delivery  Patient participation: Yes- Able to Participate  Level of consciousness: awake and alert and oriented  Pain management: adequate  Airway patency: patent  CHAUNCEY mitigation strategies: Multimodal analgesia  PONV status at discharge: No PONV  Anesthetic complications: no      Cardiovascular status: hemodynamically stable  Respiratory status: unassisted and spontaneous ventilation  Hydration status: euvolemic  Follow-up not needed.          Vitals Value Taken Time   /57 06/29/23 1048   Temp 36.3 °C (97.3 °F) 06/29/23 0709   Pulse 80 06/29/23 1050   Resp 20 06/29/23 0916   SpO2 91 % 06/29/23 1050         No case tracking events are documented in the log.      Pain/Edmund Score: Pain Rating Prior to Med Admin: 9 (6/29/2023  9:16 AM)

## 2023-06-30 LAB
BASOPHILS # BLD AUTO: 0.02 K/UL (ref 0–0.2)
BASOPHILS NFR BLD AUTO: 0.2 % (ref 0–1)
DIFFERENTIAL METHOD BLD: ABNORMAL
EOSINOPHIL # BLD AUTO: 0.04 K/UL (ref 0–0.5)
EOSINOPHIL NFR BLD AUTO: 0.5 % (ref 1–4)
ERYTHROCYTE [DISTWIDTH] IN BLOOD BY AUTOMATED COUNT: 14.8 % (ref 11.5–14.5)
HCT VFR BLD AUTO: 32.4 % (ref 38–47)
HGB BLD-MCNC: 10.2 G/DL (ref 12–16)
IMM GRANULOCYTES # BLD AUTO: 0.03 K/UL (ref 0–0.04)
IMM GRANULOCYTES NFR BLD: 0.4 % (ref 0–0.4)
LYMPHOCYTES # BLD AUTO: 3.12 K/UL (ref 1–4.8)
LYMPHOCYTES NFR BLD AUTO: 36.9 % (ref 27–41)
MCH RBC QN AUTO: 28.2 PG (ref 27–31)
MCHC RBC AUTO-ENTMCNC: 31.5 G/DL (ref 32–36)
MCV RBC AUTO: 89.5 FL (ref 80–96)
MONOCYTES # BLD AUTO: 0.37 K/UL (ref 0–0.8)
MONOCYTES NFR BLD AUTO: 4.4 % (ref 2–6)
MPC BLD CALC-MCNC: 13 FL (ref 9.4–12.4)
NEUTROPHILS # BLD AUTO: 4.88 K/UL (ref 1.8–7.7)
NEUTROPHILS NFR BLD AUTO: 57.6 % (ref 53–65)
NRBC # BLD AUTO: 0 X10E3/UL
NRBC, AUTO (.00): 0 %
PLATELET # BLD AUTO: 108 K/UL (ref 150–400)
RBC # BLD AUTO: 3.62 M/UL (ref 4.2–5.4)
WBC # BLD AUTO: 8.46 K/UL (ref 4.5–11)

## 2023-06-30 PROCEDURE — 85025 COMPLETE CBC W/AUTO DIFF WBC: CPT | Performed by: ADVANCED PRACTICE MIDWIFE

## 2023-06-30 PROCEDURE — 11000001 HC ACUTE MED/SURG PRIVATE ROOM

## 2023-06-30 PROCEDURE — 25000003 PHARM REV CODE 250: Performed by: ADVANCED PRACTICE MIDWIFE

## 2023-06-30 RX ADMIN — IBUPROFEN 800 MG: 800 TABLET, FILM COATED ORAL at 07:06

## 2023-06-30 RX ADMIN — Medication 1 TABLET: at 09:06

## 2023-06-30 RX ADMIN — HYDROCODONE BITARTRATE AND ACETAMINOPHEN 1 TABLET: 5; 325 TABLET ORAL at 01:06

## 2023-06-30 RX ADMIN — HYDROCODONE BITARTRATE AND ACETAMINOPHEN 1 TABLET: 5; 325 TABLET ORAL at 09:06

## 2023-06-30 RX ADMIN — HYDROCODONE BITARTRATE AND ACETAMINOPHEN 1 TABLET: 5; 325 TABLET ORAL at 07:06

## 2023-06-30 RX ADMIN — IBUPROFEN 800 MG: 800 TABLET, FILM COATED ORAL at 09:06

## 2023-06-30 RX ADMIN — HYDROCODONE BITARTRATE AND ACETAMINOPHEN 1 TABLET: 7.5; 325 TABLET ORAL at 01:06

## 2023-07-01 VITALS
DIASTOLIC BLOOD PRESSURE: 57 MMHG | SYSTOLIC BLOOD PRESSURE: 98 MMHG | RESPIRATION RATE: 18 BRPM | HEART RATE: 78 BPM | HEIGHT: 63 IN | BODY MASS INDEX: 45.57 KG/M2 | WEIGHT: 257.19 LBS | TEMPERATURE: 98 F | OXYGEN SATURATION: 99 %

## 2023-07-01 PROBLEM — Z34.90 ENCOUNTER FOR ELECTIVE INDUCTION OF LABOR: Status: RESOLVED | Noted: 2023-06-29 | Resolved: 2023-07-01

## 2023-07-01 PROCEDURE — 25000003 PHARM REV CODE 250: Performed by: ADVANCED PRACTICE MIDWIFE

## 2023-07-01 RX ORDER — OXYCODONE HYDROCHLORIDE 5 MG/1
5 TABLET ORAL EVERY 4 HOURS PRN
Qty: 13 TABLET | Refills: 0 | Status: SHIPPED | OUTPATIENT
Start: 2023-07-01

## 2023-07-01 RX ORDER — ACETAMINOPHEN 325 MG/1
650 TABLET ORAL EVERY 4 HOURS PRN
Qty: 60 TABLET | Refills: 0 | Status: SHIPPED | OUTPATIENT
Start: 2023-07-01

## 2023-07-01 RX ADMIN — DOCUSATE SODIUM 200 MG: 100 CAPSULE, LIQUID FILLED ORAL at 08:07

## 2023-07-01 RX ADMIN — Medication 1 TABLET: at 08:07

## 2023-07-01 RX ADMIN — HYDROCODONE BITARTRATE AND ACETAMINOPHEN 1 TABLET: 5; 325 TABLET ORAL at 12:07

## 2023-07-01 RX ADMIN — IBUPROFEN 800 MG: 800 TABLET, FILM COATED ORAL at 08:07

## 2023-07-01 NOTE — DISCHARGE SUMMARY
"[facility] -  Labor and Delivery  Obstetrics  Discharge Summary      Patient Name: Clair Dang  MRN: [id number]  Admission Date: 2023  Hospital Length of Stay: 2 days  Discharge Date and Time:  2023 12:16 PM  Attending Physician: Abbey Gu MD   Discharging Provider: BRETT DIAS MD   Primary Care Provider: Primary Doctor No    HPI: No notes on file        * No surgery found *  :  S/p Vaginal delivery    Hospital Course:   33y/o H PPD# 2 s/p .  Patient presented for IOL @ 39wks w/ favorable cervix.  She delivered a viable male infant w/o complications.  Patient has done well in the postpartum period.  Discharge home on PPD#2.      PE:  S:  Tolerating regular diet/ Voiding well/Breastfeeding/Bonding w/ infant:  Pain 10    O:  BP (!) 98/57   Pulse 78   Temp 98.1 °F (36.7 °C)   Resp 18   Ht 5' 3" (1.6 m)   Wt 116.7 kg (257 lb 3.2 oz)   LMP 2022 (Exact Date)   SpO2 99%   Breastfeeding Yes   BMI 45.56 kg/m²   Breast:  Soft, NT  Fundus:  Firm, Below umbilicus:  S/p Fundal massage  Vag: Intact, Minimal lochia  Ext:  (-)C/C/1+edma, NT     Consults (From admission, onward)        Status Ordering Provider     Inpatient consult to Lactation  Once        Provider:  (Not yet assigned)    Acknowledged ABBEY GU          Final Active Diagnoses:    Diagnosis Date Noted POA    Thrombocytopenia [D69.6] 2023 No     (spontaneous vaginal delivery) [O80] 2023 Not Applicable      Problems Resolved During this Admission:    Diagnosis Date Noted Date Resolved POA    PRINCIPAL PROBLEM:  Encounter for elective induction of labor [Z34.90] 2023 Not Applicable        Significant Diagnostic Studies: Labs: CMP No results for input(s): NA, K, CL, CO2, GLU, BUN, CREATININE, CALCIUM, PROT, ALBUMIN, BILITOT, ALKPHOS, AST, ALT, ANIONGAP, ESTGFRAFRICA, EGFRNONAA in the last 48 hours.      Feeding Method: breast    Immunizations     Date " Immunization Status Dose Route/Site Given by    06/30/23 2053 MMR Deleted 0.5 mL Subcutaneous/     06/29/23 1336 Tdap Incomplete 0.5 mL Intramuscular/           Delivery:    Episiotomy: None   Lacerations: None   Repair suture: None   Repair # of packets:     Blood loss (ml):       Birth information:  YOB: 2022   Time of birth: 10:24 AM   Sex: male   Delivery type: Vaginal, Spontaneous   Gestational Age: 39w0d    Delivery Clinician:      Other providers:       Additional  information:  Forceps:    Vacuum:    Breech:    Observed anomalies      Living?:           APGARS  One minute Five minutes Ten minutes   Skin color:         Heart rate:         Grimace:         Muscle tone:         Breathing:         Totals: 9  9        Placenta: Delivered:       appearance    Pending Diagnostic Studies:     None          Discharged Condition: good    Disposition: Home or Self Care    Follow Up:  In 2-4wks    Patient Instructions:   No discharge procedures on file.  Medications:  Current Discharge Medication List      START taking these medications    Details   acetaminophen (TYLENOL) 325 MG tablet Take 2 tablets (650 mg total) by mouth every 4 (four) hours as needed.  Qty: 60 tablet, Refills: 0    Comments: Patient is unable to take NSAID's:   Thrombocytopenia      ibuprofen (ADVIL,MOTRIN) 800 MG tablet Take 1 tablet (800 mg total) by mouth every 8 (eight) hours as needed (cramping).  Qty: 60 tablet, Refills: 1      oxyCODONE (ROXICODONE) 5 MG immediate release tablet Take 1 tablet (5 mg total) by mouth every 4 (four) hours as needed for Pain.  Qty: 13 tablet, Refills: 0    Comments: n/a          CONTINUE these medications which have NOT CHANGED    Details   ferrous sulfate 325 (65 FE) MG EC tablet Take 1 tablet (325 mg total) by mouth 3 (three) times daily with meals.  Qty: 60 tablet, Refills: 4    Associated Diagnoses: Anemia, unspecified type             BRETT DIAS MD  Obstetrics  [facility]  -  Labor and Delivery

## 2023-07-01 NOTE — HOSPITAL COURSE
33y/o H PPD# 2 s/p .  Patient presented for IOL @ 39wks w/ favorable cervix.  She delivered a viable male infant w/o complications.  Patient has done well in the postpartum period.  Discharge home on PPD#2.

## 2023-07-01 NOTE — ASSESSMENT & PLAN NOTE
D/c home  Pelvic rest  Breast care  Continue PNV  F/u w/ Primary in 4wks  RH(+)/ RI  All questions answered; patient and spouse exemplified understanding

## 2023-07-01 NOTE — PLAN OF CARE
Problem:  Fall Injury Risk  Goal: Absence of Fall, Infant Drop and Related Injury  Outcome: Met     Problem: Adult Inpatient Plan of Care  Goal: Plan of Care Review  Outcome: Met  Goal: Patient-Specific Goal (Individualized)  Outcome: Met  Goal: Absence of Hospital-Acquired Illness or Injury  Outcome: Met  Goal: Optimal Comfort and Wellbeing  Outcome: Met  Goal: Readiness for Transition of Care  Outcome: Met     Problem: Bariatric Environmental Safety  Goal: Safety Maintained with Care  Outcome: Met     Problem: Infection  Goal: Absence of Infection Signs and Symptoms  Outcome: Met     Problem: Adjustment to Role Transition (Postpartum Vaginal Delivery)  Goal: Successful Maternal Role Transition  Outcome: Met     Problem: Bleeding (Postpartum Vaginal Delivery)  Goal: Hemostasis  Outcome: Met     Problem: Infection (Postpartum Vaginal Delivery)  Goal: Absence of Infection Signs/Symptoms  Outcome: Met     Problem: Pain (Postpartum Vaginal Delivery)  Goal: Acceptable Pain Control  Outcome: Met     Problem: Urinary Retention (Postpartum Vaginal Delivery)  Goal: Effective Urinary Elimination  Outcome: Met

## 2023-07-14 ENCOUNTER — HOSPITAL ENCOUNTER (EMERGENCY)
Facility: HOSPITAL | Age: 33
Discharge: HOME | End: 2023-07-14
Attending: EMERGENCY MEDICINE

## 2023-07-14 ENCOUNTER — HOSPITAL ENCOUNTER (EMERGENCY)
Facility: HOSPITAL | Age: 33
Discharge: ANOTHER HOSPITAL | End: 2023-07-14
Payer: COMMERCIAL

## 2023-07-14 VITALS
RESPIRATION RATE: 17 BRPM | SYSTOLIC BLOOD PRESSURE: 140 MMHG | TEMPERATURE: 98 F | BODY MASS INDEX: 33.66 KG/M2 | HEIGHT: 63 IN | OXYGEN SATURATION: 96 % | HEART RATE: 81 BPM | DIASTOLIC BLOOD PRESSURE: 87 MMHG | WEIGHT: 190 LBS

## 2023-07-14 VITALS
BODY MASS INDEX: 41.64 KG/M2 | HEIGHT: 63 IN | WEIGHT: 235 LBS | TEMPERATURE: 98 F | SYSTOLIC BLOOD PRESSURE: 149 MMHG | HEART RATE: 53 BPM | OXYGEN SATURATION: 97 % | DIASTOLIC BLOOD PRESSURE: 73 MMHG | RESPIRATION RATE: 18 BRPM

## 2023-07-14 DIAGNOSIS — G44.89 HEADACHE SYNDROME: Primary | ICD-10-CM

## 2023-07-14 PROCEDURE — 99284 EMERGENCY DEPT VISIT MOD MDM: CPT | Mod: ,,, | Performed by: NURSE PRACTITIONER

## 2023-07-14 PROCEDURE — 25000003 PHARM REV CODE 250: Performed by: NURSE PRACTITIONER

## 2023-07-14 PROCEDURE — 99284 EMERGENCY DEPT VISIT MOD MDM: CPT | Mod: ,,, | Performed by: EMERGENCY MEDICINE

## 2023-07-14 PROCEDURE — 96372 THER/PROPH/DIAG INJ SC/IM: CPT | Performed by: EMERGENCY MEDICINE

## 2023-07-14 PROCEDURE — 63600175 PHARM REV CODE 636 W HCPCS: Performed by: EMERGENCY MEDICINE

## 2023-07-14 PROCEDURE — 99284 PR EMERGENCY DEPT VISIT,LEVEL IV: ICD-10-PCS | Mod: ,,, | Performed by: EMERGENCY MEDICINE

## 2023-07-14 PROCEDURE — 99284 EMERGENCY DEPT VISIT MOD MDM: CPT

## 2023-07-14 PROCEDURE — 99284 PR EMERGENCY DEPT VISIT,LEVEL IV: ICD-10-PCS | Mod: ,,, | Performed by: NURSE PRACTITIONER

## 2023-07-14 PROCEDURE — 99285 EMERGENCY DEPT VISIT HI MDM: CPT

## 2023-07-14 RX ORDER — PROCHLORPERAZINE 25 MG
25 SUPPOSITORY, RECTAL RECTAL EVERY 12 HOURS PRN
Qty: 10 SUPPOSITORY | Refills: 0 | Status: SHIPPED | OUTPATIENT
Start: 2023-07-14

## 2023-07-14 RX ORDER — PROCHLORPERAZINE EDISYLATE 5 MG/ML
10 INJECTION INTRAMUSCULAR; INTRAVENOUS
Status: COMPLETED | OUTPATIENT
Start: 2023-07-14 | End: 2023-07-14

## 2023-07-14 RX ORDER — ACETAMINOPHEN 500 MG
1000 TABLET ORAL
Status: COMPLETED | OUTPATIENT
Start: 2023-07-14 | End: 2023-07-14

## 2023-07-14 RX ADMIN — ACETAMINOPHEN 1000 MG: 500 TABLET ORAL at 05:07

## 2023-07-14 RX ADMIN — PROCHLORPERAZINE EDISYLATE 10 MG: 5 INJECTION INTRAMUSCULAR; INTRAVENOUS at 07:07

## 2023-07-14 NOTE — ED TRIAGE NOTES
Pt to ER for headache that started lastnight with neck pain. Patient reports nausea but no vomiting. Denies any fever, patient gave birth 2weeks ago, reports epidural during delievery, denies any complications with pregnancy or birth. Pt denies any illnesses.

## 2023-07-14 NOTE — ED PROVIDER NOTES
Encounter Date: 7/14/2023    SCRIBE #1 NOTE: I, Lily Waqar, am scribing for, and in the presence of,  Kang Dimas MD. I have scribed the entire note.     History     Chief Complaint   Patient presents with    Headache     The patient is a 32 y.o. female who presents to the emergency department for a headache. The patient explains that last night she got a severe headache that started in her neck and radiated to the back of her head. She was able to fall asleep and woke up this morning with only lingering head pain and a sore neck. However, the pain started again this evening. She describes her pain as an intermittent pounding. She states that standing up seems to ease the pain, but light makes it slightly worse. The headache yesterday came with nausea, but not the one from today. She denies fever, chills, and weakness. She has never had a headache like this before, and she denies family history of migraines. She also denies history of diabetes and hypertension. She gave birth 2 weeks ago and received an epidural. She did not have a headache when she received an epidural. There are no other complaints in the ED at this time.     The history is provided by the patient. No  was used.   Review of patient's allergies indicates:   Allergen Reactions    Penicillins Hives     No past medical history on file.  No past surgical history on file.  Family History   Problem Relation Age of Onset    Diabetes Paternal Grandmother     Hypertension Paternal Grandmother     Hypertension Maternal Grandfather      Social History     Tobacco Use    Smoking status: Never     Passive exposure: Never    Smokeless tobacco: Never   Substance Use Topics    Alcohol use: Never    Drug use: Never     Review of Systems   Constitutional:  Negative for chills and fever.   Eyes:  Positive for photophobia.   Gastrointestinal:  Negative for nausea.   Musculoskeletal:  Positive for neck pain.   Neurological:  Positive for  headaches.   All other systems reviewed and are negative.    Physical Exam     Initial Vitals [07/14/23 1809]   BP Pulse Resp Temp SpO2   136/79 (!) 57 15 97.9 °F (36.6 °C) 100 %      MAP       --         Physical Exam    Nursing note and vitals reviewed.  Constitutional: She appears well-developed and well-nourished.   HENT:   Head: Normocephalic and atraumatic.   Mouth/Throat: Oropharynx is clear and moist.   Eyes: Conjunctivae and EOM are normal. Pupils are equal, round, and reactive to light.   Neck: Neck supple.   Normal range of motion.  Cardiovascular:  Normal rate, regular rhythm, normal heart sounds and intact distal pulses.           Pulmonary/Chest: Breath sounds normal.   Abdominal: Abdomen is soft. Bowel sounds are normal.   Musculoskeletal:         General: Normal range of motion.      Cervical back: Normal range of motion and neck supple.     Neurological: She is alert and oriented to person, place, and time. She has normal strength. She displays normal reflexes. No cranial nerve deficit or sensory deficit. GCS score is 15. GCS eye subscore is 4. GCS verbal subscore is 5. GCS motor subscore is 6.   Skin: Skin is warm and dry.   Psychiatric: She has a normal mood and affect. Her behavior is normal. Judgment and thought content normal.       ED Course   Procedures  Labs Reviewed - No data to display       Imaging Results    None          Medications   prochlorperazine injection Soln 10 mg (10 mg Intramuscular Given 7/14/23 1911)     Medical Decision Making:   Initial Assessment:   32-year-old female patient came to the emergency department complaining of headache nausea.  Physical examination is normal including normal neurological examination  Differential Diagnosis:   Migraine headache  Tension headache  Muscles headache  ED Management:  n the emergency department the patient received the following medication:  prochlorperazine injection Soln 10 mg IM   Symptoms improved.  We will discharge the  patient home    In the emergency department the patient received the following medication:  prochlorperazine injection Soln 10 mg IM   Symptoms improved.  We will discharge the patient home          Attending Attestation:           Physician Attestation for Scribe:  Physician Attestation Statement for Scribe #1: I, Kang Dimas MD, reviewed documentation, as scribed by Lily Zavaleta in my presence, and it is both accurate and complete.                        Clinical Impression:   Final diagnoses:  [G44.89] Headache syndrome (Primary)        ED Disposition Condition    Discharge Stable          ED Prescriptions       Medication Sig Dispense Start Date End Date Auth. Provider    prochlorperazine (COMPAZINE) 25 MG suppository Place 1 suppository (25 mg total) rectally every 12 (twelve) hours as needed (Headache and nausea). 10 suppository 7/14/2023 -- Kang Dimas MD          Follow-up Information    None          Kang Dimas MD  07/15/23 1923

## 2023-07-14 NOTE — ED TRIAGE NOTES
Patient presents to the ED with headache at a 3/10 on pain and sore neck.  Patient states she had a baby 2 weeks ago with no complications but she did have an epidural.  Patient states that headache started yesterday evening.

## 2023-07-14 NOTE — ED PROVIDER NOTES
Encounter Date: 7/14/2023       History     Chief Complaint   Patient presents with    ED Pt. Call Back     Patient presents to the ED with complaints of a headache and neck pain. Patient states headache started last night, states she had a baby 2 weeks ago with an epidural. Patient reports she was sleep last night but woke up with her neck sore and headache had improved until this afternoon when it came back strong again. Patient denies any fever, states she is breastfeeding.     The history is provided by the patient.     Review of patient's allergies indicates:   Allergen Reactions    Penicillins Hives     History reviewed. No pertinent past medical history.  History reviewed. No pertinent surgical history.  Family History   Problem Relation Age of Onset    Diabetes Paternal Grandmother     Hypertension Paternal Grandmother     Hypertension Maternal Grandfather      Social History     Tobacco Use    Smoking status: Never     Passive exposure: Never    Smokeless tobacco: Never   Substance Use Topics    Alcohol use: Never    Drug use: Never     Review of Systems   Constitutional: Negative.    HENT: Negative.     Eyes:  Positive for photophobia.   Respiratory: Negative.     Cardiovascular: Negative.    Musculoskeletal: Negative.    Skin: Negative.    Neurological:  Positive for headaches.   Psychiatric/Behavioral: Negative.     All other systems reviewed and are negative.      Physical Exam     Initial Vitals [07/14/23 1541]   BP Pulse Resp Temp SpO2   (!) 144/89 69 17 98.4 °F (36.9 °C) 99 %      MAP       --         Physical Exam    Vitals reviewed.  Constitutional: She appears well-developed.   Appears to be in pain   HENT:   Head: Normocephalic and atraumatic.   Eyes: EOM are normal. Pupils are equal, round, and reactive to light.   Cardiovascular:  Normal rate, regular rhythm, normal heart sounds and intact distal pulses.           Pulmonary/Chest: Breath sounds normal.   Musculoskeletal:         General: Normal  range of motion.     Neurological: She is alert and oriented to person, place, and time. She has normal strength. GCS score is 15. GCS eye subscore is 4. GCS verbal subscore is 5. GCS motor subscore is 6.   Skin: Skin is warm and dry.   Psychiatric: She has a normal mood and affect. Judgment and thought content normal.         Medical Screening Exam   See Full Note    ED Course   Procedures  Labs Reviewed - No data to display       Imaging Results    None          Medications   acetaminophen tablet 1,000 mg (1,000 mg Oral Given 7/14/23 1700)     Medical Decision Making:   ED Management:  MDM    Patient presents for emergent evaluation of acute headache s/p epidural 2 weeks ago that poses a threat to life and/or bodily function.    In the ED patient found to have acute spinal headache.       Transfer MDM  I discussed the patient presentation with [facility] that further discussed the patient with on call anesthesiologist, Dr. Yee and Dr. Tolentino at [place] [facility], they will accept patient to come over for further evaluation and treatment.   Patient was managed in the ED with oral Tylenol, quiet dark room.    The response to treatment was fair, patient felt better while lying flat.                            Clinical Impression:   Final diagnoses:  [O89.4] Postpartum spinal headache (Primary)        ED Disposition Condition    Transfer to Another Facility Stable                Tsering Downey, JOSE  07/14/23 2296       Tsering Downey, JOSE  08/02/23 0740

## 2023-07-18 ENCOUNTER — OFFICE VISIT (OUTPATIENT)
Dept: OBSTETRICS AND GYNECOLOGY | Facility: CLINIC | Age: 33
End: 2023-07-18

## 2023-07-18 VITALS
DIASTOLIC BLOOD PRESSURE: 78 MMHG | BODY MASS INDEX: 41.85 KG/M2 | HEART RATE: 71 BPM | TEMPERATURE: 98 F | SYSTOLIC BLOOD PRESSURE: 120 MMHG | HEIGHT: 63 IN | OXYGEN SATURATION: 99 % | RESPIRATION RATE: 18 BRPM | WEIGHT: 236.19 LBS

## 2023-07-18 DIAGNOSIS — R51.9 FREQUENT HEADACHES: ICD-10-CM

## 2023-07-18 PROCEDURE — 3078F PR MOST RECENT DIASTOLIC BLOOD PRESSURE < 80 MM HG: ICD-10-PCS | Mod: ,,, | Performed by: ADVANCED PRACTICE MIDWIFE

## 2023-07-18 PROCEDURE — 3074F SYST BP LT 130 MM HG: CPT | Mod: ,,, | Performed by: ADVANCED PRACTICE MIDWIFE

## 2023-07-18 PROCEDURE — 1159F PR MEDICATION LIST DOCUMENTED IN MEDICAL RECORD: ICD-10-PCS | Mod: ,,, | Performed by: ADVANCED PRACTICE MIDWIFE

## 2023-07-18 PROCEDURE — 0503F PR POSTPARTUM CARE VISIT: ICD-10-PCS | Mod: ,,, | Performed by: ADVANCED PRACTICE MIDWIFE

## 2023-07-18 PROCEDURE — 3074F PR MOST RECENT SYSTOLIC BLOOD PRESSURE < 130 MM HG: ICD-10-PCS | Mod: ,,, | Performed by: ADVANCED PRACTICE MIDWIFE

## 2023-07-18 PROCEDURE — 1111F PR DISCHARGE MEDS RECONCILED W/ CURRENT OUTPATIENT MED LIST: ICD-10-PCS | Mod: ,,, | Performed by: ADVANCED PRACTICE MIDWIFE

## 2023-07-18 PROCEDURE — 0503F POSTPARTUM CARE VISIT: CPT | Mod: ,,, | Performed by: ADVANCED PRACTICE MIDWIFE

## 2023-07-18 PROCEDURE — 3008F PR BODY MASS INDEX (BMI) DOCUMENTED: ICD-10-PCS | Mod: ,,, | Performed by: ADVANCED PRACTICE MIDWIFE

## 2023-07-18 PROCEDURE — 3008F BODY MASS INDEX DOCD: CPT | Mod: ,,, | Performed by: ADVANCED PRACTICE MIDWIFE

## 2023-07-18 PROCEDURE — 1159F MED LIST DOCD IN RCRD: CPT | Mod: ,,, | Performed by: ADVANCED PRACTICE MIDWIFE

## 2023-07-18 PROCEDURE — 1111F DSCHRG MED/CURRENT MED MERGE: CPT | Mod: ,,, | Performed by: ADVANCED PRACTICE MIDWIFE

## 2023-07-18 PROCEDURE — 3078F DIAST BP <80 MM HG: CPT | Mod: ,,, | Performed by: ADVANCED PRACTICE MIDWIFE

## 2023-07-18 RX ORDER — BUTALBITAL, ACETAMINOPHEN AND CAFFEINE 50; 325; 40 MG/1; MG/1; MG/1
1 TABLET ORAL EVERY 4 HOURS PRN
Qty: 30 TABLET | Refills: 0 | Status: SHIPPED | OUTPATIENT
Start: 2023-07-18 | End: 2023-08-17

## 2023-07-18 NOTE — PROGRESS NOTES
"CC: Post-partum follow-up    Clair Dang is a 32 y.o. female  who presents for post-partum visit.  She is S/P a .  The baby is doing well. She C/O a headache that comes and goes since Thursday night- comes and goes, describes headache as a stiff neck, mostly at the back of my head and I can feel it in my eyes. Denies any n/v and c/o diarrhea before the headaches starts.  was seen at the hospital for blood patch and was placed on compazine.  No pain.  No fever.   No bowel / bladder complaints.    Delivery Date: 2023  Delivery MD: BRITTANEY Augustin. CNM  Gender: male  Birth Weight: 7 pounds 10 ounces  Breast Feeding: YES  Depression: NO  Contraception: vasectomy    Pregnancy was complicated by:  Low platelets      /78   Pulse 71   Temp 97.9 °F (36.6 °C) (Oral)   Resp 18   Ht 5' 3" (1.6 m)   Wt 107.1 kg (236 lb 3.2 oz)   LMP 2022 (Exact Date) Comment: Birth was 2023  SpO2 99%   Breastfeeding Yes   BMI 41.84 kg/m²     ROS:  GENERAL: No fever, chills, fatigability.  VULVAR: No pain, no lesions and no itching.  VAGINAL: No relaxation, no itching, no discharge, no abnormal bleeding and no lesions.  ABDOMEN: No abdominal pain. Denies nausea. Denies vomiting. No diarrhea. No constipation  BREAST: Denies pain. No lumps. No discharge.  URINARY: No incontinence, no nocturia, no frequency and no dysuria.  CARDIOVASCULAR: No chest pain. No shortness of breath. No leg cramps.  NEUROLOGICAL: No headaches. No vision changes.    PHYSICAL EXAM:  ABDOMEN:  Soft, non-tender, non-distended  VULVA:  Normal, no lesions  CERVIX:  Without lesions, polyps or tenderness.  UTERUS:  Normal size, shape, consistency, no mass or tenderness.  ADNEXA:  Normal in size without mass or tenderness    IMP:  3 Weeks Postpartum  Status Post   Encounter for postpartum visit    Frequent headaches  -     butalbital-acetaminophen-caffeine -40 mg (FIORICET, ESGIC) -40 mg per tablet; Take 1 tablet by mouth " every 4 (four) hours as needed for Pain.  Dispense: 30 tablet; Refill: 0        ICD-10-CM ICD-9-CM    1. Encounter for postpartum visit  Z39.2 V24.2       2. Frequent headaches  R51.9 784.0 butalbital-acetaminophen-caffeine -40 mg (FIORICET, ESGIC) -40 mg per tablet          PLAN:  May resume normal activities after 6 weeks  May return to school/work after 6 weeks postpartum   Birth control options and counseling discussed  F/u 1 week for evaluation of headaches, f/u @ ER if no improvement  Verbalized understanding to all information and instructions  Questions answered to desired level of satisfaction    Follow up in about 1 week (around 7/25/2023), or if symptoms worsen or fail to improve, for postpartum visit/exam.

## 2023-07-20 ENCOUNTER — PATIENT MESSAGE (OUTPATIENT)
Dept: RADIOLOGY | Facility: HOSPITAL | Age: 33
End: 2023-07-20

## 2023-08-23 ENCOUNTER — OFFICE VISIT (OUTPATIENT)
Dept: OBSTETRICS AND GYNECOLOGY | Facility: CLINIC | Age: 33
End: 2023-08-23

## 2023-08-23 VITALS
WEIGHT: 231.63 LBS | DIASTOLIC BLOOD PRESSURE: 84 MMHG | HEART RATE: 62 BPM | OXYGEN SATURATION: 98 % | TEMPERATURE: 97 F | HEIGHT: 63 IN | SYSTOLIC BLOOD PRESSURE: 133 MMHG | BODY MASS INDEX: 41.04 KG/M2

## 2023-08-23 DIAGNOSIS — Z30.9 ENCOUNTER FOR CONTRACEPTIVE MANAGEMENT, UNSPECIFIED TYPE: ICD-10-CM

## 2023-08-23 PROCEDURE — 99214 OFFICE O/P EST MOD 30 MIN: CPT | Mod: ,,, | Performed by: ADVANCED PRACTICE MIDWIFE

## 2023-08-23 PROCEDURE — 99214 PR OFFICE/OUTPT VISIT, EST, LEVL IV, 30-39 MIN: ICD-10-PCS | Mod: ,,, | Performed by: ADVANCED PRACTICE MIDWIFE

## 2023-08-23 RX ORDER — LACTIC ACID, L-, CITRIC ACID MONOHYDRATE, AND POTASSIUM BITARTRATE 90; 50; 20 MG/5G; MG/5G; MG/5G
1 GEL VAGINAL
Qty: 45 G | Refills: 12 | Status: SHIPPED | OUTPATIENT
Start: 2023-08-23

## 2023-08-23 NOTE — PROCEDURES
Procedures  OB ultrasound Note:    BPD- 37 weeks 3 day  HC-39 weeks 0 day  AC- 36 weeks 3 day  FL- 37 weeks 1 day    ANGEL- 12.49 cm    Fetal heart rate:  153 bpm    Fetal position- vertex  Placental location- posterior    Impression-    KIM June 30, 2023  Estimated gestational age 37 weeks 4 day  EFW 3086 g (6 lb 12 oz)  Pctl ( EFW) 44 th percentile      Biophysical Profile:    Fetal Movements- 2  Fetal breathing- 2  Fetal Tone- 2  Amniotic Fluid Volume- 2    Total - 8

## 2023-08-23 NOTE — PROGRESS NOTES
"CC: Post-partum follow-up  Clair Dang is a 32 y.o. female  who presents for post-partum visit.  She is S/P a .  The baby is doing well.  No pain.  No fever.   No bowel / bladder complaints.     Delivery Date: 2023  Delivery MD: BRITTANEY Augustin. GIANFRANCOM  Gender: male  Birth Weight: 7 pounds 10 ounces  Breast Feeding: YES  Depression: NO  Contraception: vasectomy, requests non hormonal birth control gel until  gets vasectomy     Pregnancy was complicated by:  Low platelets    /84   Pulse 62   Temp 97.4 °F (36.3 °C)   Ht 5' 3" (1.6 m)   Wt 105.1 kg (231 lb 9.6 oz)   SpO2 98%   Breastfeeding Yes   BMI 41.03 kg/m²     ROS:  GENERAL: No fever, chills, fatigability.  VULVAR: No pain, no lesions and no itching.  VAGINAL: No relaxation, no itching, no discharge, no abnormal bleeding and no lesions.  ABDOMEN: No abdominal pain. Denies nausea. Denies vomiting. No diarrhea. No constipation  BREAST: Denies pain. No lumps. No discharge.  URINARY: No incontinence, no nocturia, no frequency and no dysuria.  CARDIOVASCULAR: No chest pain. No shortness of breath. No leg cramps.  NEUROLOGICAL: No headaches. No vision changes.    PHYSICAL EXAM:  ABDOMEN:  Soft, non-tender, non-distended  VULVA:  Normal, no lesions  CERVIX:  Without lesions, polyps or tenderness.  UTERUS:  Normal size, shape, consistency, no mass or tenderness.  ADNEXA:  Normal in size without mass or tenderness    IMP:  8 Weeks Postpartum  Status Post   Postpartum exam    Encounter for contraceptive management, unspecified type  -     lactic acid-citric-potassium (PHEXXI) 1.8-1-0.4 % Gel; Place 1 application  vaginally as needed (1 hour before sex and after each ejaculation prior to resuming intercourse).  Dispense: 45 g; Refill: 12        ICD-10-CM ICD-9-CM    1. Postpartum exam  Z39.2 V24.2       2. Encounter for contraceptive management, unspecified type  Z30.9 V25.9 lactic acid-citric-potassium (PHEXXI) 1.8-1-0.4 % Gel    "       PLAN:  May resume normal activities after 6 weeks  May return to school/work after 6 weeks postpartum   Birth control options and counseling discussed  Verbalized understanding to all information and instructions  Questions answered to desired level of satisfaction    Follow up in about 3 months (around 11/23/2023), or if symptoms worsen or fail to improve, for annual exam.
